# Patient Record
Sex: MALE | Race: WHITE | NOT HISPANIC OR LATINO
[De-identification: names, ages, dates, MRNs, and addresses within clinical notes are randomized per-mention and may not be internally consistent; named-entity substitution may affect disease eponyms.]

---

## 2018-11-30 ENCOUNTER — APPOINTMENT (OUTPATIENT)
Dept: INTERNAL MEDICINE | Facility: CLINIC | Age: 78
End: 2018-11-30

## 2018-12-03 ENCOUNTER — RESULT REVIEW (OUTPATIENT)
Age: 78
End: 2018-12-03

## 2018-12-03 ENCOUNTER — APPOINTMENT (OUTPATIENT)
Dept: PAIN MANAGEMENT | Facility: HOSPITAL | Age: 78
End: 2018-12-03

## 2018-12-19 ENCOUNTER — RECORD ABSTRACTING (OUTPATIENT)
Age: 78
End: 2018-12-19

## 2018-12-19 DIAGNOSIS — Z84.1 FAMILY HISTORY OF DISORDERS OF KIDNEY AND URETER: ICD-10-CM

## 2018-12-19 DIAGNOSIS — M72.0 PALMAR FASCIAL FIBROMATOSIS [DUPUYTREN]: ICD-10-CM

## 2018-12-19 DIAGNOSIS — J42 UNSPECIFIED CHRONIC BRONCHITIS: ICD-10-CM

## 2018-12-19 DIAGNOSIS — Z83.3 FAMILY HISTORY OF DIABETES MELLITUS: ICD-10-CM

## 2018-12-19 DIAGNOSIS — E78.00 PURE HYPERCHOLESTEROLEMIA, UNSPECIFIED: ICD-10-CM

## 2018-12-21 ENCOUNTER — APPOINTMENT (OUTPATIENT)
Dept: PAIN MANAGEMENT | Facility: CLINIC | Age: 78
End: 2018-12-21
Payer: MEDICARE

## 2018-12-21 VITALS
BODY MASS INDEX: 26.07 KG/M2 | WEIGHT: 176 LBS | OXYGEN SATURATION: 96 % | HEART RATE: 106 BPM | HEIGHT: 69 IN | SYSTOLIC BLOOD PRESSURE: 118 MMHG | DIASTOLIC BLOOD PRESSURE: 72 MMHG

## 2018-12-21 DIAGNOSIS — Z86.79 PERSONAL HISTORY OF OTHER DISEASES OF THE CIRCULATORY SYSTEM: ICD-10-CM

## 2018-12-21 PROCEDURE — 99214 OFFICE O/P EST MOD 30 MIN: CPT

## 2019-01-02 ENCOUNTER — RX RENEWAL (OUTPATIENT)
Age: 79
End: 2019-01-02

## 2019-01-03 ENCOUNTER — RX RENEWAL (OUTPATIENT)
Age: 79
End: 2019-01-03

## 2019-01-10 ENCOUNTER — APPOINTMENT (OUTPATIENT)
Dept: PAIN MANAGEMENT | Facility: HOSPITAL | Age: 79
End: 2019-01-10

## 2019-01-10 ENCOUNTER — RESULT REVIEW (OUTPATIENT)
Age: 79
End: 2019-01-10

## 2019-01-16 ENCOUNTER — APPOINTMENT (OUTPATIENT)
Dept: PAIN MANAGEMENT | Facility: CLINIC | Age: 79
End: 2019-01-16
Payer: MEDICARE

## 2019-01-16 VITALS
BODY MASS INDEX: 26.07 KG/M2 | SYSTOLIC BLOOD PRESSURE: 122 MMHG | HEIGHT: 69 IN | WEIGHT: 176 LBS | DIASTOLIC BLOOD PRESSURE: 78 MMHG

## 2019-01-16 DIAGNOSIS — M47.816 SPONDYLOSIS W/OUT MYELOPATHY OR RADICULOPATHY, LUMBAR REGION: ICD-10-CM

## 2019-01-16 DIAGNOSIS — G62.9 POLYNEUROPATHY, UNSPECIFIED: ICD-10-CM

## 2019-01-16 DIAGNOSIS — M79.18 MYALGIA, OTHER SITE: ICD-10-CM

## 2019-01-16 PROCEDURE — 99214 OFFICE O/P EST MOD 30 MIN: CPT

## 2019-01-16 NOTE — ASSESSMENT
[FreeTextEntry1] : : Pain may be secondary to severe spinal stenosis worst at L3-4, and L4-5. Given significant pain despite conservative treatments., s/p RIGHT L3-4 and L4-5 transforaminal epidural steroid injection with improvement in radicular pain\par \par SIgnificant pain maladaptive, with + SIJ provocative tests and refractory to conservative treatments.  Will schedule RIGHT SIJ steroid injection r/b/a discussed\par \par informed patient of risks of steroid administration including transient worsening of blood glucose, htn, mood changes, progressive osteoporosis.  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 Will s/p right cluneal nerve block with 10 days for 100% relief\par Continue home exercises\par continue acupuncture.    \par  \par  Significant pain secondary to facet arthritis. Refractory to conservative treatments. S/o right L3-sacral ala medial bnrach block. 60% relief.\par s/p right l3-sacral ala medial branch radiofrequecy ablation (1/2018) with improvement in pain 50% that was sustained. Patient feelst hat axial back pain has returning and affecting QOL\par s/p repeat right L3-sacral ala medial branch radiofreqeucny ablation with improvement in axial back pain\par  \par  coontinue home exercises\par trial voltaren gel prn pain.    \par \par  \par Instructed patient to maintain pain diary to monitor pain level, mobility, and function.\par Patient was instructed to call with any worsening symptoms including severe pain, new numbness/weakness, or changes in the bowel/bladder function.    \par \par \par

## 2019-01-16 NOTE — PHYSICAL EXAM
[Normal muscle bulk without asymmetry] : normal muscle bulk without asymmetry [Spine: Flexion to ___ degrees, without pain] : spine: flexion to [unfilled] degrees, without pain [Normal] : Gait: normal [] : Motor: [NL] : normal and symmetric bilaterally [VASU Test] : positive VASU Test (Raffi's Test) [SI Provacative Testing] : positive SI Provacative Testing

## 2019-01-16 NOTE — HISTORY OF PRESENT ILLNESS
[Back Pain] : back pain [FreeTextEntry1] : Interval Note:\par s/p RIGHT L3-4 and L4-5 transforaminal epidural steroid injection 1/10/19 with 50% improvement in back pain and leg pain.  Continues to complain of right buttock pain when ambulating.  Patient saw Dr. Fredy MCKNIGHT who stated that he may benefit from SIJ steroid injection.  States that it is worse with bending, twisting and turning.  Denies any additional weakness, numbness, bowel/bladder dysfunction.  Pain intensity is 5/10\par \par Mr. JOSE MCARTHUR is a 78 year M with pmhx of  hlp on [id number] of primary prophylaxis, crf followed by Dr. Lee, bilateral knee replacement at [facility] 12 years ago, presents with right lower back pain without radiating, worse with golfing/tennis/ activity. Improves with rest. Pain started insiduously but worsened 6 weeks ago without inciting event. Denies weakness, numbness, bowel/bladder incontinence. Pain level is 5/10.\par \par  Pain therapies \par         PT - mild relief\par \par Previous nerve block or injection\par \par s/p RIGHT L3-4 and L4-5 transforaminal epidural steroid injection 1/10/19 \par         right L3-sacral ala medial branch block 11/5/18\par         right cluneal nerve block 7/5/18\par         right L3-4, L4-5 transforaminal epidural steroid iinjection\par         s/p L3-sacral ala right MBB rfa with 50% improvement\par         improved bilatearl L3-sacral ala mbb 10/12/17\par         LESI by Dr. Echavarria 10/2017 - minimal relief\par         \par \par  Imaging studies  \par \par         MRI LS 10/2017\par \par L1-2 minimal posterior step-off of L1 on L2, no central/foraminal narrowing\par         L2-3 market ligamentum hypertrophy and changes of facet arthrtiis with indentation of L3 nerve roots in lateral recess with moderate central canal narrowing. Mild neurofomirnal narrowing \par         L3-4 mild disc bulge, indenting the L4 nerve roots in the lateral recess. Marked ligamentum hypertrophy and chnages of marked facet arthritis, with modarete to severe central canal and mild neuroforminal narrowing.\par         L4-5 Minimal anterior step-off of L4 on L5, mild uncovering of disc. Marked ligamentum hypertrophy tehre is indentation of right greater than let L5 nerve roots and indentation especially from left posterior lateral thecal sac with trifoliated appearance of moderate/severe central canal and mild neuroformianl narrowing. Marked facet arthritis\par         L5-S1 slight disc bulge, indenting the left greater than right S1 nerve roots. Modreate-marked ligamentum hypertorphy indenting the posterior lateral aspect of the thecal cord. Facet arthritis with slight left neuroforimanl narrowing\par \par  [5] : an average pain level of 5/10 [9] : a maximum pain level of 9/10 [Aching] : aching [Transitioning] : transitioning [Bending] : bending [Rest] : rest [FreeTextEntry7] : Patient presents with  right lower back pain. The pain has been occurring for many years but worse in the last few months. The pain frequency is constant. The character of the pain is described as _, Throbbing, Dull. The patient reports pain level at NRS:  5/10. . With activity, the pain intensity increases to  7/10. The pain increases with activity. The pain is decreased by rest. Patient reports that perfoming activities of daily living very dfificult.

## 2019-01-17 ENCOUNTER — APPOINTMENT (OUTPATIENT)
Dept: PAIN MANAGEMENT | Facility: HOSPITAL | Age: 79
End: 2019-01-17

## 2019-01-17 ENCOUNTER — RESULT REVIEW (OUTPATIENT)
Age: 79
End: 2019-01-17

## 2019-01-24 ENCOUNTER — APPOINTMENT (OUTPATIENT)
Dept: VASCULAR SURGERY | Facility: CLINIC | Age: 79
End: 2019-01-24
Payer: MEDICARE

## 2019-01-24 VITALS
DIASTOLIC BLOOD PRESSURE: 80 MMHG | HEIGHT: 69 IN | SYSTOLIC BLOOD PRESSURE: 136 MMHG | WEIGHT: 170 LBS | HEART RATE: 77 BPM | BODY MASS INDEX: 25.18 KG/M2

## 2019-01-24 DIAGNOSIS — Z78.9 OTHER SPECIFIED HEALTH STATUS: ICD-10-CM

## 2019-01-24 PROCEDURE — 93978 VASCULAR STUDY: CPT

## 2019-01-24 PROCEDURE — 99204 OFFICE O/P NEW MOD 45 MIN: CPT

## 2019-01-25 PROBLEM — Z78.9 CONSUMES ALCOHOL OCCASIONALLY: Status: ACTIVE | Noted: 2019-01-24

## 2019-01-25 PROBLEM — Z78.9 EXERCISES OCCASIONALLY: Status: ACTIVE | Noted: 2019-01-24

## 2019-01-25 NOTE — ASSESSMENT
[FreeTextEntry1] : 77 yo male with AAA. It appears that his aneurysm grew >5 mm over the last year ( it is incompletely imaged on MRI from 2018). The patient's father had a ruptured abdominal aortic aneurysm and I think it is reasonable to intervene secondary to the rapid growth combined with the family history. He is reluctant to proceed with a CTA given his elevated CR. I discussed this with Dr. Nazario. His Cr is stable at 2.5.\par \par Will proceed with CT scan of abdomen and pelvis without contrast\par

## 2019-01-25 NOTE — HISTORY OF PRESENT ILLNESS
[FreeTextEntry1] : 79 yo male referred by Dr. Dillon for a AAA. The patient reports that he has a history of back pain.He underwent an MRI which demonstrated a 5.0 cm AAA. He reports that he underwent a MRI for a similar reason 1 year ago and that there was no mention of a AAA. He reports that his father had a ruptured AAA. He has CRI, but is unsure of his kidney function.

## 2019-01-25 NOTE — CONSULT LETTER
[Dear  ___] : Dear ~TOSHIA, [Consult Letter:] : I had the pleasure of evaluating your patient, [unfilled]. [Please see my note below.] : Please see my note below. [Consult Closing:] : Thank you very much for allowing me to participate in the care of this patient.  If you have any questions, please do not hesitate to contact me. [Sincerely,] : Sincerely, [FreeTextEntry2] : Rosalina Aravind\par Stere Jacquelin\par  [FreeTextEntry3] : Monty

## 2019-01-25 NOTE — DATA REVIEWED
[FreeTextEntry1] : u/s AAA - 5.1 cm AAA\par \par MRI from 2018 and current reviewed with the radiologist

## 2019-01-25 NOTE — REVIEW OF SYSTEMS
[Fever] : no fever [Chills] : no chills [Recent Weight Loss (___ Lbs)] : recent [unfilled] ~Ulb weight loss [Leg Claudication] : no intermittent leg claudication [Lower Ext Edema] : no extremity edema [As Noted in HPI] : as noted in HPI [Limb Pain] : no limb pain [Limb Swelling] : no limb swelling

## 2019-01-30 ENCOUNTER — RESULT REVIEW (OUTPATIENT)
Age: 79
End: 2019-01-30

## 2019-02-01 ENCOUNTER — APPOINTMENT (OUTPATIENT)
Dept: VASCULAR SURGERY | Facility: CLINIC | Age: 79
End: 2019-02-01
Payer: MEDICARE

## 2019-02-01 ENCOUNTER — OUTPATIENT (OUTPATIENT)
Dept: OUTPATIENT SERVICES | Facility: HOSPITAL | Age: 79
LOS: 1 days | End: 2019-02-01
Payer: MEDICARE

## 2019-02-01 DIAGNOSIS — Z01.818 ENCOUNTER FOR OTHER PREPROCEDURAL EXAMINATION: ICD-10-CM

## 2019-02-01 LAB
ALBUMIN SERPL ELPH-MCNC: 4.4 G/DL — SIGNIFICANT CHANGE UP (ref 3.3–5)
ALP SERPL-CCNC: 61 U/L — SIGNIFICANT CHANGE UP (ref 40–120)
ALT FLD-CCNC: 13 U/L — SIGNIFICANT CHANGE UP (ref 10–45)
ANION GAP SERPL CALC-SCNC: 12 MMOL/L — SIGNIFICANT CHANGE UP (ref 5–17)
APTT BLD: 33.2 SEC — SIGNIFICANT CHANGE UP (ref 27.5–36.3)
AST SERPL-CCNC: 22 U/L — SIGNIFICANT CHANGE UP (ref 10–40)
BASOPHILS NFR BLD AUTO: 0.4 % — SIGNIFICANT CHANGE UP (ref 0–2)
BILIRUB SERPL-MCNC: 0.4 MG/DL — SIGNIFICANT CHANGE UP (ref 0.2–1.2)
BLD GP AB SCN SERPL QL: NEGATIVE — SIGNIFICANT CHANGE UP
BLD GP AB SCN SERPL QL: NEGATIVE — SIGNIFICANT CHANGE UP
BUN SERPL-MCNC: 29 MG/DL — HIGH (ref 7–23)
CALCIUM SERPL-MCNC: 9.2 MG/DL — SIGNIFICANT CHANGE UP (ref 8.4–10.5)
CHLORIDE SERPL-SCNC: 109 MMOL/L — HIGH (ref 96–108)
CO2 SERPL-SCNC: 23 MMOL/L — SIGNIFICANT CHANGE UP (ref 22–31)
CREAT SERPL-MCNC: 2.19 MG/DL — HIGH (ref 0.5–1.3)
EOSINOPHIL NFR BLD AUTO: 1.6 % — SIGNIFICANT CHANGE UP (ref 0–6)
GLUCOSE SERPL-MCNC: 96 MG/DL — SIGNIFICANT CHANGE UP (ref 70–99)
HCT VFR BLD CALC: 39.2 % — SIGNIFICANT CHANGE UP (ref 39–50)
HGB BLD-MCNC: 13.4 G/DL — SIGNIFICANT CHANGE UP (ref 13–17)
INR BLD: 1.03 — SIGNIFICANT CHANGE UP (ref 0.88–1.16)
LYMPHOCYTES # BLD AUTO: 12.4 % — LOW (ref 13–44)
MCHC RBC-ENTMCNC: 30.1 PG — SIGNIFICANT CHANGE UP (ref 27–34)
MCHC RBC-ENTMCNC: 34.2 G/DL — SIGNIFICANT CHANGE UP (ref 32–36)
MCV RBC AUTO: 88.1 FL — SIGNIFICANT CHANGE UP (ref 80–100)
MONOCYTES NFR BLD AUTO: 8.4 % — SIGNIFICANT CHANGE UP (ref 2–14)
NEUTROPHILS NFR BLD AUTO: 77.2 % — HIGH (ref 43–77)
PLATELET # BLD AUTO: 198 K/UL — SIGNIFICANT CHANGE UP (ref 150–400)
POTASSIUM SERPL-MCNC: 4.7 MMOL/L — SIGNIFICANT CHANGE UP (ref 3.5–5.3)
POTASSIUM SERPL-SCNC: 4.7 MMOL/L — SIGNIFICANT CHANGE UP (ref 3.5–5.3)
PROT SERPL-MCNC: 7 G/DL — SIGNIFICANT CHANGE UP (ref 6–8.3)
PROTHROM AB SERPL-ACNC: 11.6 SEC — SIGNIFICANT CHANGE UP (ref 10–12.9)
RBC # BLD: 4.45 M/UL — SIGNIFICANT CHANGE UP (ref 4.2–5.8)
RBC # FLD: 15.1 % — SIGNIFICANT CHANGE UP (ref 10.3–16.9)
RH IG SCN BLD-IMP: POSITIVE — SIGNIFICANT CHANGE UP
RH IG SCN BLD-IMP: POSITIVE — SIGNIFICANT CHANGE UP
SODIUM SERPL-SCNC: 144 MMOL/L — SIGNIFICANT CHANGE UP (ref 135–145)
WBC # BLD: 7.9 K/UL — SIGNIFICANT CHANGE UP (ref 3.8–10.5)
WBC # FLD AUTO: 7.9 K/UL — SIGNIFICANT CHANGE UP (ref 3.8–10.5)

## 2019-02-01 PROCEDURE — 99214 OFFICE O/P EST MOD 30 MIN: CPT

## 2019-02-01 PROCEDURE — 86901 BLOOD TYPING SEROLOGIC RH(D): CPT

## 2019-02-01 PROCEDURE — 80053 COMPREHEN METABOLIC PANEL: CPT

## 2019-02-01 PROCEDURE — 93925 LOWER EXTREMITY STUDY: CPT

## 2019-02-01 PROCEDURE — 85610 PROTHROMBIN TIME: CPT

## 2019-02-01 PROCEDURE — 85025 COMPLETE CBC W/AUTO DIFF WBC: CPT

## 2019-02-01 PROCEDURE — 86900 BLOOD TYPING SEROLOGIC ABO: CPT

## 2019-02-01 PROCEDURE — 86850 RBC ANTIBODY SCREEN: CPT

## 2019-02-01 PROCEDURE — 85730 THROMBOPLASTIN TIME PARTIAL: CPT

## 2019-02-01 PROCEDURE — 93880 EXTRACRANIAL BILAT STUDY: CPT

## 2019-02-01 NOTE — PHYSICAL EXAM
[Normal Breath Sounds] : Normal breath sounds [Normal Heart Sounds] : normal heart sounds [1+] : left 1+ [2+] : left 2+ [No Rash or Lesion] : No rash or lesion [Alert] : alert [Oriented to Person] : oriented to person [Oriented to Place] : oriented to place [Oriented to Time] : oriented to time [Calm] : calm [Carotid Bruits] : no carotid bruits [Ankle Swelling (On Exam)] : not present [Varicose Veins Of Lower Extremities] : not present [Abdomen Tenderness] : ~T ~M No abdominal tenderness [Skin Ulcer] : no ulcer [de-identified] : AOx4, calm and cooperative, sitting in the chair [FreeTextEntry1] : Widened and enhanced pulsatility midline, above umbilicus

## 2019-02-01 NOTE — ASSESSMENT
[Smoking Cessation] : smoking cessation [Aneurysm Surgery] : aneurysm surgery [FreeTextEntry1] : 79 y/o M with PMH of HTN, HLD, CKD III, active smoker, and known infrarenal AAA (5.2cm) warranting repair. Benefits and risk of procedure options were discussed with pt and pt's wife during visit. Given location and extent of aneurysm, it has been deemed open repair is the best option for the patient at this time. \par \par Plan:\par -Surgery on 2/5/2019: Open AAA repair. \par -Cardiac records will be obtained from Dr Sarmiento's office. \par -Preop labs ordered and pt to obtain today after visit.

## 2019-02-01 NOTE — REVIEW OF SYSTEMS
[Joint Pain] : joint pain [Easy Bruising] : a tendency for easy bruising [Fever] : no fever [Chills] : no chills [Feeling Poorly] : not feeling poorly [Feeling Tired] : not feeling tired [Heart Rate Is Slow] : the heart rate was not slow [Heart Rate Is Fast] : the heart rate was not fast [Chest Pain] : no chest pain [Palpitations] : no palpitations [Leg Claudication] : no intermittent leg claudication [Lower Ext Edema] : no extremity edema [Shortness Of Breath] : no shortness of breath [Wheezing] : no wheezing [Cough] : no cough [SOB on Exertion] : no shortness of breath during exertion [Abdominal Pain] : no abdominal pain [Vomiting] : no vomiting [Constipation] : no constipation [Diarrhea] : no diarrhea [Limb Pain] : no limb pain [Limb Swelling] : no limb swelling [Skin Lesions] : no skin lesions [Skin Wound] : no skin wound [Confused] : no confusion [Dizziness] : no dizziness [Difficulty Walking] : no difficulty walking [Easy Bleeding] : no tendency for easy bleeding [FreeTextEntry9] : Back pain and stiffness  [de-identified] : Easy bruising while he was on ASA 81mg a while ago and has now stopped

## 2019-02-01 NOTE — REASON FOR VISIT
[Initial Eval - Existing Diagnosis] : an initial evaluation of an existing diagnosis [Spouse] : spouse

## 2019-02-04 VITALS
OXYGEN SATURATION: 98 % | WEIGHT: 172.4 LBS | TEMPERATURE: 98 F | SYSTOLIC BLOOD PRESSURE: 120 MMHG | HEART RATE: 70 BPM | RESPIRATION RATE: 16 BRPM | DIASTOLIC BLOOD PRESSURE: 67 MMHG | HEIGHT: 69 IN

## 2019-02-04 NOTE — PATIENT PROFILE ADULT - NSPROEDALEARNPREF_GEN_A_NUR
Ventricular Rate : 64   Atrial Rate : 258   QRS Duration : 120   Q-T Interval : 406   QTC Calculation(Bezet) : 418   R Axis : -82   T Axis : 100   Diagnosis : Atrial fibrillation~Left axis deviation~Right bundle branch block~Inferior infarct , age undetermined~T wave abnormality, consider lateral ischemia~Abnormal ECG~When compared with ECG of 18-JAN-2017 22:28,~MANUAL COMPARISON REQUIRED, DATA IS UNCONFIRMED~Confirmed by BLAIRE KOTHARI, MERT ([id number]) on 1/20/2017 7:24:22 PM     
individual instruction

## 2019-02-04 NOTE — PRE-OP CHECKLIST - SELECT TESTS ORDERED
CMP/EKG/B+, stress echo/Type and Screen/CBC/PT/PTT/INR CBC/INR/Type and Screen/B+, stress echo, Ct chest 1/30/19, CT abd/pelvis 1/30/19/CMP/PT/PTT/EKG

## 2019-02-04 NOTE — PATIENT PROFILE ADULT - NSTRANSFERBELONGINGSDISPO_GEN_A_NUR
Patient refusing bed alarm. Charge YAO Garcia at bedside. Education provided. Bed alarm off.   not applicable

## 2019-02-05 ENCOUNTER — INPATIENT (INPATIENT)
Facility: HOSPITAL | Age: 79
LOS: 3 days | Discharge: HOME | DRG: 254 | End: 2019-02-09
Attending: SURGERY | Admitting: SURGERY
Payer: MEDICARE

## 2019-02-05 ENCOUNTER — APPOINTMENT (OUTPATIENT)
Age: 79
End: 2019-02-05

## 2019-02-05 ENCOUNTER — RESULT REVIEW (OUTPATIENT)
Age: 79
End: 2019-02-05

## 2019-02-05 DIAGNOSIS — Z98.890 OTHER SPECIFIED POSTPROCEDURAL STATES: Chronic | ICD-10-CM

## 2019-02-05 LAB
ANION GAP SERPL CALC-SCNC: 10 MMOL/L — SIGNIFICANT CHANGE UP (ref 5–17)
APTT BLD: 46.6 SEC — HIGH (ref 27.5–36.3)
BASE EXCESS BLDA CALC-SCNC: -7.3 MMOL/L — LOW (ref -2–3)
BASE EXCESS BLDA CALC-SCNC: 0.6 MMOL/L — SIGNIFICANT CHANGE UP (ref -2–3)
BASOPHILS # BLD AUTO: 0.06 K/UL — SIGNIFICANT CHANGE UP (ref 0–0.2)
BASOPHILS NFR BLD AUTO: 0.4 % — SIGNIFICANT CHANGE UP (ref 0–2)
BUN SERPL-MCNC: 36 MG/DL — HIGH (ref 7–23)
CA-I BLDA-SCNC: 0.99 MMOL/L — LOW (ref 1.12–1.3)
CA-I BLDA-SCNC: 1.45 MMOL/L — HIGH (ref 1.12–1.3)
CALCIUM SERPL-MCNC: 10.3 MG/DL — SIGNIFICANT CHANGE UP (ref 8.4–10.5)
CHLORIDE SERPL-SCNC: 113 MMOL/L — HIGH (ref 96–108)
CO2 SERPL-SCNC: 22 MMOL/L — SIGNIFICANT CHANGE UP (ref 22–31)
COHGB MFR BLDA: 0.3 % — SIGNIFICANT CHANGE UP
COHGB MFR BLDA: 0.4 % — SIGNIFICANT CHANGE UP
CREAT SERPL-MCNC: 1.96 MG/DL — HIGH (ref 0.5–1.3)
EOSINOPHIL # BLD AUTO: 0.06 K/UL — SIGNIFICANT CHANGE UP (ref 0–0.5)
EOSINOPHIL NFR BLD AUTO: 0.4 % — SIGNIFICANT CHANGE UP (ref 0–6)
GAS PNL BLDA: SIGNIFICANT CHANGE UP
GLUCOSE BLDC GLUCOMTR-MCNC: 107 MG/DL — HIGH (ref 70–99)
GLUCOSE BLDC GLUCOMTR-MCNC: 139 MG/DL — HIGH (ref 70–99)
GLUCOSE BLDC GLUCOMTR-MCNC: 174 MG/DL — HIGH (ref 70–99)
GLUCOSE SERPL-MCNC: 168 MG/DL — HIGH (ref 70–99)
HCO3 BLDA-SCNC: 19 MMOL/L — LOW (ref 21–28)
HCO3 BLDA-SCNC: 27 MMOL/L — SIGNIFICANT CHANGE UP (ref 21–28)
HCT VFR BLD CALC: 35.4 % — LOW (ref 39–50)
HGB BLD-MCNC: 11.2 G/DL — LOW (ref 13–17)
HGB BLDA-MCNC: 10.2 G/DL — LOW (ref 13–17)
HGB BLDA-MCNC: 11.6 G/DL — LOW (ref 13–17)
IMM GRANULOCYTES NFR BLD AUTO: 1.2 % — SIGNIFICANT CHANGE UP (ref 0–1.5)
INR BLD: 1.14 — SIGNIFICANT CHANGE UP (ref 0.88–1.16)
LYMPHOCYTES # BLD AUTO: 1.39 K/UL — SIGNIFICANT CHANGE UP (ref 1–3.3)
LYMPHOCYTES # BLD AUTO: 9.7 % — LOW (ref 13–44)
MAGNESIUM SERPL-MCNC: 1.7 MG/DL — SIGNIFICANT CHANGE UP (ref 1.6–2.6)
MCHC RBC-ENTMCNC: 28.8 PG — SIGNIFICANT CHANGE UP (ref 27–34)
MCHC RBC-ENTMCNC: 31.6 GM/DL — LOW (ref 32–36)
MCV RBC AUTO: 91 FL — SIGNIFICANT CHANGE UP (ref 80–100)
METHGB MFR BLDA: 0.1 % — SIGNIFICANT CHANGE UP
METHGB MFR BLDA: 0.2 % — SIGNIFICANT CHANGE UP
MONOCYTES # BLD AUTO: 0.22 K/UL — SIGNIFICANT CHANGE UP (ref 0–0.9)
MONOCYTES NFR BLD AUTO: 1.5 % — LOW (ref 2–14)
NEUTROPHILS # BLD AUTO: 12.37 K/UL — HIGH (ref 1.8–7.4)
NEUTROPHILS NFR BLD AUTO: 86.8 % — HIGH (ref 43–77)
O2 CT VFR BLDA CALC: 15 ML/DL — SIGNIFICANT CHANGE UP (ref 15–23)
O2 CT VFR BLDA CALC: SIGNIFICANT CHANGE UP (ref 15–23)
OXYHGB MFR BLDA: 99 % — SIGNIFICANT CHANGE UP (ref 94–100)
OXYHGB MFR BLDA: 99 % — SIGNIFICANT CHANGE UP (ref 94–100)
PCO2 BLDA: 39 MMHG — SIGNIFICANT CHANGE UP (ref 35–48)
PCO2 BLDA: 52 MMHG — HIGH (ref 35–48)
PH BLDA: 7.29 — LOW (ref 7.35–7.45)
PH BLDA: 7.34 — LOW (ref 7.35–7.45)
PLATELET # BLD AUTO: 324 K/UL — SIGNIFICANT CHANGE UP (ref 150–400)
PO2 BLDA: 312 MMHG — HIGH (ref 83–108)
PO2 BLDA: 315 MMHG — HIGH (ref 83–108)
POTASSIUM BLDA-SCNC: 3.7 MMOL/L — SIGNIFICANT CHANGE UP (ref 3.5–4.9)
POTASSIUM BLDA-SCNC: 4.1 MMOL/L — SIGNIFICANT CHANGE UP (ref 3.5–4.9)
POTASSIUM SERPL-MCNC: 4.6 MMOL/L — SIGNIFICANT CHANGE UP (ref 3.5–5.3)
POTASSIUM SERPL-SCNC: 4.6 MMOL/L — SIGNIFICANT CHANGE UP (ref 3.5–5.3)
PROTHROM AB SERPL-ACNC: 12.9 SEC — SIGNIFICANT CHANGE UP (ref 10–12.9)
RBC # BLD: 3.89 M/UL — LOW (ref 4.2–5.8)
RBC # FLD: 15 % — HIGH (ref 10.3–14.5)
SAO2 % BLDA: 100 % — SIGNIFICANT CHANGE UP (ref 95–100)
SAO2 % BLDA: 99 % — SIGNIFICANT CHANGE UP (ref 95–100)
SODIUM BLDA-SCNC: 142 MMOL/L — SIGNIFICANT CHANGE UP (ref 138–146)
SODIUM BLDA-SCNC: 144 MMOL/L — SIGNIFICANT CHANGE UP (ref 138–146)
SODIUM SERPL-SCNC: 145 MMOL/L — SIGNIFICANT CHANGE UP (ref 135–145)
WBC # BLD: 14.27 K/UL — HIGH (ref 3.8–10.5)
WBC # FLD AUTO: 14.27 K/UL — HIGH (ref 3.8–10.5)

## 2019-02-05 PROCEDURE — 99222 1ST HOSP IP/OBS MODERATE 55: CPT | Mod: GC

## 2019-02-05 PROCEDURE — 35081 REPAIR DEFECT OF ARTERY: CPT

## 2019-02-05 PROCEDURE — 35081 REPAIR DEFECT OF ARTERY: CPT | Mod: 82

## 2019-02-05 PROCEDURE — 71045 X-RAY EXAM CHEST 1 VIEW: CPT | Mod: 26

## 2019-02-05 RX ORDER — CEFAZOLIN SODIUM 1 G
2000 VIAL (EA) INJECTION EVERY 8 HOURS
Qty: 0 | Refills: 0 | Status: DISCONTINUED | OUTPATIENT
Start: 2019-02-05 | End: 2019-02-05

## 2019-02-05 RX ORDER — CEFAZOLIN SODIUM 1 G
2000 VIAL (EA) INJECTION EVERY 8 HOURS
Qty: 0 | Refills: 0 | Status: DISCONTINUED | OUTPATIENT
Start: 2019-02-05 | End: 2019-02-06

## 2019-02-05 RX ORDER — NICARDIPINE HYDROCHLORIDE 30 MG/1
5 CAPSULE, EXTENDED RELEASE ORAL
Qty: 40 | Refills: 0 | Status: DISCONTINUED | OUTPATIENT
Start: 2019-02-05 | End: 2019-02-06

## 2019-02-05 RX ORDER — ATORVASTATIN CALCIUM 80 MG/1
40 TABLET, FILM COATED ORAL AT BEDTIME
Qty: 0 | Refills: 0 | Status: DISCONTINUED | OUTPATIENT
Start: 2019-02-05 | End: 2019-02-09

## 2019-02-05 RX ORDER — INSULIN LISPRO 100/ML
VIAL (ML) SUBCUTANEOUS EVERY 6 HOURS
Qty: 0 | Refills: 0 | Status: DISCONTINUED | OUTPATIENT
Start: 2019-02-05 | End: 2019-02-08

## 2019-02-05 RX ORDER — AMLODIPINE BESYLATE 2.5 MG/1
10 TABLET ORAL DAILY
Qty: 0 | Refills: 0 | Status: DISCONTINUED | OUTPATIENT
Start: 2019-02-05 | End: 2019-02-06

## 2019-02-05 RX ORDER — SODIUM CHLORIDE 9 MG/ML
1000 INJECTION, SOLUTION INTRAVENOUS
Qty: 0 | Refills: 0 | Status: DISCONTINUED | OUTPATIENT
Start: 2019-02-05 | End: 2019-02-06

## 2019-02-05 RX ORDER — AMLODIPINE BESYLATE 2.5 MG/1
1 TABLET ORAL
Qty: 0 | Refills: 0 | COMMUNITY

## 2019-02-05 RX ORDER — ROSUVASTATIN CALCIUM 5 MG/1
1 TABLET ORAL
Qty: 0 | Refills: 0 | COMMUNITY

## 2019-02-05 RX ORDER — ACETAMINOPHEN 500 MG
1000 TABLET ORAL ONCE
Qty: 0 | Refills: 0 | Status: COMPLETED | OUTPATIENT
Start: 2019-02-05 | End: 2019-02-05

## 2019-02-05 RX ORDER — HYDROMORPHONE HYDROCHLORIDE 2 MG/ML
0.5 INJECTION INTRAMUSCULAR; INTRAVENOUS; SUBCUTANEOUS
Qty: 0 | Refills: 0 | Status: DISCONTINUED | OUTPATIENT
Start: 2019-02-05 | End: 2019-02-08

## 2019-02-05 RX ADMIN — HYDROMORPHONE HYDROCHLORIDE 0.5 MILLIGRAM(S): 2 INJECTION INTRAMUSCULAR; INTRAVENOUS; SUBCUTANEOUS at 17:28

## 2019-02-05 RX ADMIN — Medication 2000 MILLIGRAM(S): at 21:38

## 2019-02-05 RX ADMIN — Medication 2: at 16:49

## 2019-02-05 RX ADMIN — ATORVASTATIN CALCIUM 40 MILLIGRAM(S): 80 TABLET, FILM COATED ORAL at 23:10

## 2019-02-05 RX ADMIN — HYDROMORPHONE HYDROCHLORIDE 0.5 MILLIGRAM(S): 2 INJECTION INTRAMUSCULAR; INTRAVENOUS; SUBCUTANEOUS at 17:58

## 2019-02-05 RX ADMIN — Medication 400 MILLIGRAM(S): at 21:00

## 2019-02-05 RX ADMIN — Medication 1000 MILLIGRAM(S): at 21:30

## 2019-02-05 NOTE — BRIEF OPERATIVE NOTE - PROCEDURE
<<-----Click on this checkbox to enter Procedure AAA repair with graft  02/05/2019  open AAA repair. Transabdominal approach  Active  MVISMER

## 2019-02-05 NOTE — BRIEF OPERATIVE NOTE - OPERATION/FINDINGS
Transabdominal approach.   Juxtarenal AAA repaired with 18 mm tube graft with anastomosis to distal aorta.   25 mg of manitol given at 1 pm

## 2019-02-05 NOTE — CONSULT NOTE ADULT - ASSESSMENT
77yo M h/o CKD stage 3, baseline Cr 2.2, HLD, HTN, Chronic Bronchitis, AAA now s/p open repair    Neuro: tylenole prn, dilaudid prn  CV: HD stable, SBP goals 110-150, cardene gtt as needed, norvasc PO in AM, lipitor  Pulm:satting well on NC  GI/FEN: NPO/NGT to LIWS  : Etienne for strict Is&Os, replete diuresis >600 in 4 hours  ID: Ancef post op (2/5-6)  Endo: iss  Heme: no chemo ppx  PPX: SCDs  Lines: PIVs, Keisha  Wounds: midline incision  PT/OT: not ordered

## 2019-02-05 NOTE — H&P PST ADULT - ASSESSMENT
77 yo M with COPD, CKD, HTN, AAA:   - Admit to vascular surgery for AAA repair    - NPO/IVF/ Pre-op eval      CKD: Renal consult (Dr. Velásquez) and light hydration.  - Trend I/O  - Etienne catheter     COPD: NC as needed, nebbs PRN

## 2019-02-05 NOTE — CONSULT NOTE ADULT - SUBJECTIVE AND OBJECTIVE BOX
77yo M h/o CKD stage 3, baseline Cr 2.2, HLD, HTN, Chronic Bronchitis, AAA now s/p open repair    Meds: amlodipine 10, crestor 10  Allergies    No Known Allergies    Intolerances        Vitals: Vital Signs Last 24 Hrs  T(C): 35.7 (05 Feb 2019 15:49), Max: 36.9 (04 Feb 2019 16:26)  T(F): 96.2 (05 Feb 2019 15:49), Max: 98.4 (04 Feb 2019 16:26)  HR: 110 (05 Feb 2019 15:45) (70 - 110)  BP: 135/78 (05 Feb 2019 15:45) (120/67 - 135/78)  BP(mean): 99 (05 Feb 2019 15:45) (99 - 99)  RR: 16 (05 Feb 2019 15:30) (16 - 16)  SpO2: 99% (05 Feb 2019 15:45) (98% - 99%)  CAPILLARY BLOOD GLUCOSE      POCT Blood Glucose.: 107 mg/dL (05 Feb 2019 13:13)      Labs: pending          Exam:  Neuro: A&Ox3, NAD, grossly neuro intact  HEENT: PERRL < EOMI, MMM  Neck: Supple  CV: RRR, no MRGs  Pulm: CTAB, no wheezes, rales, or rhonchi  Abd: BS (-), Soft, diffusely ttp throughout, dressing intact over long midline incision  : Etienne in place  Ext: No edema peripherally or centrally  Vasc: 2+ pulses - radial and PT  MSK: no joint swelling  Skin: no rash  Psych: affect appropriate 77yo M h/o CKD stage 3, baseline Cr 2.2, HLD, HTN, Chronic Bronchitis, AAA now s/p open repair  Pain is well controlled, no SOB or CP, 10 point ROS otherwise negative    Meds: amlodipine 10, crestor 10  Allergies    No Known Allergies    Intolerances        Vitals: Vital Signs Last 24 Hrs  T(C): 35.7 (05 Feb 2019 15:49), Max: 36.9 (04 Feb 2019 16:26)  T(F): 96.2 (05 Feb 2019 15:49), Max: 98.4 (04 Feb 2019 16:26)  HR: 110 (05 Feb 2019 15:45) (70 - 110)  BP: 135/78 (05 Feb 2019 15:45) (120/67 - 135/78)  BP(mean): 99 (05 Feb 2019 15:45) (99 - 99)  RR: 16 (05 Feb 2019 15:30) (16 - 16)  SpO2: 99% (05 Feb 2019 15:45) (98% - 99%)  CAPILLARY BLOOD GLUCOSE      POCT Blood Glucose.: 107 mg/dL (05 Feb 2019 13:13)      Labs: pending          Exam:  Neuro: A&Ox3, NAD, grossly neuro intact  HEENT: PERRL < EOMI, MMM  Neck: Supple  CV: RRR, no MRGs  Pulm: CTAB, no wheezes, rales, or rhonchi  Abd: BS (-), Soft, diffusely ttp throughout, dressing intact over long midline incision  : Etienne in place  Ext: No edema peripherally or centrally  Vasc: 2+ pulses - radial and PT  MSK: no joint swelling  Skin: no rash  Psych: affect appropriate

## 2019-02-05 NOTE — H&P PST ADULT - HISTORY OF PRESENT ILLNESS
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   The aneurysm was incidentally found during imagining for back pain. He was seen by Dr. Hinojosa in [facility] and referred for further evaluation to dr. Cunha.     Patient is not a candidate for EVAR due to the location of the aneurysm.    He smoked for 15 yrs and quit in early adulthood.   Not on ASA or AC due to bleeding.

## 2019-02-05 NOTE — H&P PST ADULT - FAMILY HISTORY
Father  Still living? Unknown  Family history of abdominal aortic aneurysm (AAA), Age at diagnosis: Age Unknown

## 2019-02-05 NOTE — H&P PST ADULT - PMH
AAA (abdominal aortic aneurysm)    BPH (benign prostatic hyperplasia)    CKD (chronic kidney disease) stage 3, GFR 30-59 ml/min    Hyperlipidemia    Hypertension    Spinal stenosis

## 2019-02-06 LAB
ANION GAP SERPL CALC-SCNC: 12 MMOL/L — SIGNIFICANT CHANGE UP (ref 5–17)
BUN SERPL-MCNC: 38 MG/DL — HIGH (ref 7–23)
CALCIUM SERPL-MCNC: 9.3 MG/DL — SIGNIFICANT CHANGE UP (ref 8.4–10.5)
CHLORIDE SERPL-SCNC: 107 MMOL/L — SIGNIFICANT CHANGE UP (ref 96–108)
CO2 SERPL-SCNC: 21 MMOL/L — LOW (ref 22–31)
CREAT SERPL-MCNC: 2.21 MG/DL — HIGH (ref 0.5–1.3)
GLUCOSE BLDC GLUCOMTR-MCNC: 113 MG/DL — HIGH (ref 70–99)
GLUCOSE BLDC GLUCOMTR-MCNC: 114 MG/DL — HIGH (ref 70–99)
GLUCOSE BLDC GLUCOMTR-MCNC: 122 MG/DL — HIGH (ref 70–99)
GLUCOSE BLDC GLUCOMTR-MCNC: 129 MG/DL — HIGH (ref 70–99)
GLUCOSE SERPL-MCNC: 149 MG/DL — HIGH (ref 70–99)
HCT VFR BLD CALC: 33.3 % — LOW (ref 39–50)
HGB BLD-MCNC: 10.9 G/DL — LOW (ref 13–17)
MAGNESIUM SERPL-MCNC: 1.7 MG/DL — SIGNIFICANT CHANGE UP (ref 1.6–2.6)
MCHC RBC-ENTMCNC: 29.3 PG — SIGNIFICANT CHANGE UP (ref 27–34)
MCHC RBC-ENTMCNC: 32.7 GM/DL — SIGNIFICANT CHANGE UP (ref 32–36)
MCV RBC AUTO: 89.5 FL — SIGNIFICANT CHANGE UP (ref 80–100)
NRBC # BLD: 0 /100 WBCS — SIGNIFICANT CHANGE UP (ref 0–0)
PHOSPHATE SERPL-MCNC: 5.2 MG/DL — HIGH (ref 2.5–4.5)
PLATELET # BLD AUTO: 303 K/UL — SIGNIFICANT CHANGE UP (ref 150–400)
POTASSIUM SERPL-MCNC: 5.1 MMOL/L — SIGNIFICANT CHANGE UP (ref 3.5–5.3)
POTASSIUM SERPL-SCNC: 5.1 MMOL/L — SIGNIFICANT CHANGE UP (ref 3.5–5.3)
RBC # BLD: 3.72 M/UL — LOW (ref 4.2–5.8)
RBC # FLD: 14.6 % — HIGH (ref 10.3–14.5)
SODIUM SERPL-SCNC: 140 MMOL/L — SIGNIFICANT CHANGE UP (ref 135–145)
WBC # BLD: 14.39 K/UL — HIGH (ref 3.8–10.5)
WBC # FLD AUTO: 14.39 K/UL — HIGH (ref 3.8–10.5)

## 2019-02-06 PROCEDURE — 99223 1ST HOSP IP/OBS HIGH 75: CPT | Mod: GC

## 2019-02-06 PROCEDURE — 99233 SBSQ HOSP IP/OBS HIGH 50: CPT | Mod: GC

## 2019-02-06 RX ORDER — METOPROLOL TARTRATE 50 MG
10 TABLET ORAL EVERY 6 HOURS
Qty: 0 | Refills: 0 | Status: DISCONTINUED | OUTPATIENT
Start: 2019-02-06 | End: 2019-02-07

## 2019-02-06 RX ORDER — HEPARIN SODIUM 5000 [USP'U]/ML
5000 INJECTION INTRAVENOUS; SUBCUTANEOUS EVERY 8 HOURS
Qty: 0 | Refills: 0 | Status: DISCONTINUED | OUTPATIENT
Start: 2019-02-06 | End: 2019-02-09

## 2019-02-06 RX ORDER — SODIUM CHLORIDE 9 MG/ML
1000 INJECTION INTRAMUSCULAR; INTRAVENOUS; SUBCUTANEOUS
Qty: 0 | Refills: 0 | Status: DISCONTINUED | OUTPATIENT
Start: 2019-02-06 | End: 2019-02-07

## 2019-02-06 RX ORDER — SODIUM CHLORIDE 9 MG/ML
1000 INJECTION, SOLUTION INTRAVENOUS
Qty: 0 | Refills: 0 | Status: DISCONTINUED | OUTPATIENT
Start: 2019-02-06 | End: 2019-02-06

## 2019-02-06 RX ORDER — METOPROLOL TARTRATE 50 MG
5 TABLET ORAL EVERY 6 HOURS
Qty: 0 | Refills: 0 | Status: DISCONTINUED | OUTPATIENT
Start: 2019-02-06 | End: 2019-02-06

## 2019-02-06 RX ORDER — METOPROLOL TARTRATE 50 MG
5 TABLET ORAL ONCE
Qty: 0 | Refills: 0 | Status: COMPLETED | OUTPATIENT
Start: 2019-02-06 | End: 2019-02-06

## 2019-02-06 RX ADMIN — HEPARIN SODIUM 5000 UNIT(S): 5000 INJECTION INTRAVENOUS; SUBCUTANEOUS at 14:14

## 2019-02-06 RX ADMIN — HYDROMORPHONE HYDROCHLORIDE 0.5 MILLIGRAM(S): 2 INJECTION INTRAMUSCULAR; INTRAVENOUS; SUBCUTANEOUS at 07:15

## 2019-02-06 RX ADMIN — HYDROMORPHONE HYDROCHLORIDE 0.5 MILLIGRAM(S): 2 INJECTION INTRAMUSCULAR; INTRAVENOUS; SUBCUTANEOUS at 21:00

## 2019-02-06 RX ADMIN — HYDROMORPHONE HYDROCHLORIDE 0.5 MILLIGRAM(S): 2 INJECTION INTRAMUSCULAR; INTRAVENOUS; SUBCUTANEOUS at 01:13

## 2019-02-06 RX ADMIN — HYDROMORPHONE HYDROCHLORIDE 0.5 MILLIGRAM(S): 2 INJECTION INTRAMUSCULAR; INTRAVENOUS; SUBCUTANEOUS at 12:08

## 2019-02-06 RX ADMIN — HYDROMORPHONE HYDROCHLORIDE 0.5 MILLIGRAM(S): 2 INJECTION INTRAMUSCULAR; INTRAVENOUS; SUBCUTANEOUS at 12:20

## 2019-02-06 RX ADMIN — AMLODIPINE BESYLATE 10 MILLIGRAM(S): 2.5 TABLET ORAL at 06:46

## 2019-02-06 RX ADMIN — Medication 2000 MILLIGRAM(S): at 06:45

## 2019-02-06 RX ADMIN — ATORVASTATIN CALCIUM 40 MILLIGRAM(S): 80 TABLET, FILM COATED ORAL at 21:00

## 2019-02-06 RX ADMIN — Medication 5 MILLIGRAM(S): at 21:29

## 2019-02-06 RX ADMIN — Medication 5 MILLIGRAM(S): at 17:33

## 2019-02-06 RX ADMIN — HYDROMORPHONE HYDROCHLORIDE 0.5 MILLIGRAM(S): 2 INJECTION INTRAMUSCULAR; INTRAVENOUS; SUBCUTANEOUS at 21:30

## 2019-02-06 RX ADMIN — HYDROMORPHONE HYDROCHLORIDE 0.5 MILLIGRAM(S): 2 INJECTION INTRAMUSCULAR; INTRAVENOUS; SUBCUTANEOUS at 01:30

## 2019-02-06 RX ADMIN — HYDROMORPHONE HYDROCHLORIDE 0.5 MILLIGRAM(S): 2 INJECTION INTRAMUSCULAR; INTRAVENOUS; SUBCUTANEOUS at 06:53

## 2019-02-06 RX ADMIN — HEPARIN SODIUM 5000 UNIT(S): 5000 INJECTION INTRAVENOUS; SUBCUTANEOUS at 21:00

## 2019-02-06 NOTE — DIETITIAN INITIAL EVALUATION ADULT. - OTHER INFO
77yo M h/o CKD stage 3, baseline Cr 2.2, HLD, HTN, Chronic Bronchitis, AAA now s/p open repair 2/5, POD1. HD stable. Pt NPO with +NGT to LIWS. Pt asking for water and food, reports good appetite PTA, limits salt in diet at home. Pt reports 5# wt loss in the last couple of weeks but denies change in PO intake. Pt denies N/V. Pain controlled. Last BM 2/5. Skin: surgical incision abdomen. Discussed diet advancement pending approval from medical team, with emphasis on adequate protein intake. Also discussed following heart healthy diet. Pt receptive to diet education. Of note, pt with hyperphosphatemia, will monitor need for dietary restriction and/or phos binder as diet advanced. RD to closely follow.

## 2019-02-06 NOTE — PROGRESS NOTE ADULT - SUBJECTIVE AND OBJECTIVE BOX
Patient is a 79yo man with kown CKD- creat about 2.1 range  now s/p transabdominal juxtarenal repair of AAA  aware , NC )2.  c/o some abd pain.  NAD  good urine output    PAST MEDICAL & SURGICAL HISTORY:  Spinal stenosis  AAA (abdominal aortic aneurysm)  CKD (chronic kidney disease) stage 3, GFR 30-59 ml/min  BPH (benign prostatic hyperplasia)  Hyperlipidemia  Hypertension  History of prostate surgery      MEDICATIONS  (STANDING):  amLODIPine   Tablet 10 milliGRAM(s) Oral daily  atorvastatin 40 milliGRAM(s) Oral at bedtime  heparin  Injectable 5000 Unit(s) SubCutaneous every 8 hours  insulin lispro (HumaLOG) corrective regimen sliding scale   SubCutaneous every 6 hours  lactated ringers. 1000 milliLiter(s) (125 mL/Hr) IV Continuous <Continuous>  metoprolol tartrate Injectable 5 milliGRAM(s) IV Push every 6 hours    MEDICATIONS  (PRN):  HYDROmorphone  Injectable 0.5 milliGRAM(s) IV Push every 3 hours PRN Moderate Pain (4 - 6)      Allergies    No Known Allergies            SOCIAL HISTORY: No smoke    FAMILY HISTORY:  Family history of abdominal aortic aneurysm (AAA) (Father): ruptured aaa father      T(C): , Max: 38 (02-06-19 @ 17:43)  T(F): , Max: 100.4 (02-06-19 @ 17:43)  HR: 102 (02-06-19 @ 15:00)  BP: 126/77 (02-06-19 @ 15:00)  BP(mean): 95 (02-06-19 @ 15:00)  RR: 24 (02-06-19 @ 15:00)  SpO2: 97% (02-06-19 @ 15:00)  Wt(kg): --    02-05 @ 07:01  -  02-06 @ 07:00  --------------------------------------------------------  IN:    IV PiggyBack: 150 mL    lactated ringers.: 2287 mL    niCARdipine Infusion: 15 mL  Total IN: 2452 mL    OUT:    Indwelling Catheter - Urethral: 1174 mL    Nasoenteral Tube: 150 mL  Total OUT: 1324 mL    Total NET: 1128 mL      02-06 @ 07:01  -  02-06 @ 17:55  --------------------------------------------------------  IN:    lactated ringers.: 150 mL    lactated ringers.: 625 mL  Total IN: 775 mL    OUT:    Indwelling Catheter - Urethral: 510 mL  Total OUT: 510 mL    Total NET: 265 mL            PHYSICAL EXAM:  Constitutional: Well appearing.  No acute distress  Back: No CVA tenderness  Respiratory: occ rhonchi  Cardiovascular: S1, S2.  Regular rate and rhythm.    Gastrointestinal: soft, some distention + tender   bandage clean  Extremities: Warm.  No lower extremity edema.    Neurological: No focal deficits.  Skin: Warm. Dry.    Psychiatric: Normal affect.        LABS:                        10.9   14.39 )-----------( 303      ( 06 Feb 2019 05:08 )             33.3     02-06    140  |  107  |  38<H>  ----------------------------<  149<H>  5.1   |  21<L>  |  2.21<H>    Ca    9.3      06 Feb 2019 05:08  Phos  5.2     02-06  Mg     1.7     02-06        PT/INR - ( 05 Feb 2019 16:51 )   PT: 12.9 sec;   INR: 1.14          PTT - ( 05 Feb 2019 16:51 )  PTT:46.6 sec          RADIOLOGY & ADDITIONAL STUDIES:

## 2019-02-06 NOTE — DIETITIAN INITIAL EVALUATION ADULT. - ENERGY NEEDS
Ht (2/5): 175.3cm, Wt (2/6 per pt): 75kg, IBW: 160# +/-10%, %IBW: 103%, BMI: 24.4  ABW used to calculate energy needs due to pt's current body weight within % IBW. Fluid needs per team 2/2 renal status.

## 2019-02-06 NOTE — PROGRESS NOTE ADULT - ATTENDING COMMENTS
77 yo man with CKD 3, creat 2.19 baseline now s/p AAA repair-  post op creatinine stable- will follow trend  mild hyperkalemia  non oliguric   c/w IVF-  if K rises any further would change LR to .9 NS or 1/2NS  keep net even to mild negative balance

## 2019-02-06 NOTE — PROGRESS NOTE ADULT - ASSESSMENT
79yo M h/o CKD stage 3, baseline Cr 2.2, HLD, HTN, Chronic Bronchitis, AAA now s/p open repair    Neuro: tylenole prn, dilaudid prn  CV: HD stable, SBP goals 110-150, cardene gtt as needed, norvasc PO in AM, lipitor  Pulm:satting well on NC, LR@125  GI/FEN: NPO/NGT to LIWS  : [place] for strict Is&Os  ID: Ancef post op (2/5-6)  Endo: iss  Heme: no chemo ppx  PPX: SCDs  Lines: PIVs, [place]  Wounds: midline incision  PT/OT: not ordered 77yo M h/o CKD stage 3, baseline Cr 2.2, HLD, HTN, Chronic Bronchitis, AAA now s/p open repair    Neuro: tylenole prn, dilaudid prn  CV: HD stable, SBP goals 110-150, cardene gtt as needed, norvasc PO in AM, lipitor. Dr. Jhaveri would like to keep HR < 100 so start lopressor 5 mg IV Q 6h  Pulm:satting well on NC, LR@125  GI/FEN: NPO/NGT to GALE  : [place] for strict Is&Os  ID: Ancef post op (2/5-6)  Endo: iss  Heme: no chemo ppx  PPX: SCDs  Lines: PIVs, Keisha  Wounds: midline incision  PT/OT: not ordered

## 2019-02-07 LAB
ANION GAP SERPL CALC-SCNC: 10 MMOL/L — SIGNIFICANT CHANGE UP (ref 5–17)
BUN SERPL-MCNC: 35 MG/DL — HIGH (ref 7–23)
CALCIUM SERPL-MCNC: 8.9 MG/DL — SIGNIFICANT CHANGE UP (ref 8.4–10.5)
CHLORIDE SERPL-SCNC: 106 MMOL/L — SIGNIFICANT CHANGE UP (ref 96–108)
CO2 SERPL-SCNC: 24 MMOL/L — SIGNIFICANT CHANGE UP (ref 22–31)
CREAT SERPL-MCNC: 2.38 MG/DL — HIGH (ref 0.5–1.3)
GLUCOSE BLDC GLUCOMTR-MCNC: 106 MG/DL — HIGH (ref 70–99)
GLUCOSE BLDC GLUCOMTR-MCNC: 114 MG/DL — HIGH (ref 70–99)
GLUCOSE BLDC GLUCOMTR-MCNC: 117 MG/DL — HIGH (ref 70–99)
GLUCOSE BLDC GLUCOMTR-MCNC: 125 MG/DL — HIGH (ref 70–99)
GLUCOSE SERPL-MCNC: 110 MG/DL — HIGH (ref 70–99)
HCT VFR BLD CALC: 29 % — LOW (ref 39–50)
HGB BLD-MCNC: 9.2 G/DL — LOW (ref 13–17)
MAGNESIUM SERPL-MCNC: 1.7 MG/DL — SIGNIFICANT CHANGE UP (ref 1.6–2.6)
MCHC RBC-ENTMCNC: 28.6 PG — SIGNIFICANT CHANGE UP (ref 27–34)
MCHC RBC-ENTMCNC: 31.7 GM/DL — LOW (ref 32–36)
MCV RBC AUTO: 90.1 FL — SIGNIFICANT CHANGE UP (ref 80–100)
NRBC # BLD: 0 /100 WBCS — SIGNIFICANT CHANGE UP (ref 0–0)
PHOSPHATE SERPL-MCNC: 3.6 MG/DL — SIGNIFICANT CHANGE UP (ref 2.5–4.5)
PLATELET # BLD AUTO: 266 K/UL — SIGNIFICANT CHANGE UP (ref 150–400)
POTASSIUM SERPL-MCNC: 4.5 MMOL/L — SIGNIFICANT CHANGE UP (ref 3.5–5.3)
POTASSIUM SERPL-SCNC: 4.5 MMOL/L — SIGNIFICANT CHANGE UP (ref 3.5–5.3)
RBC # BLD: 3.22 M/UL — LOW (ref 4.2–5.8)
RBC # FLD: 14.8 % — HIGH (ref 10.3–14.5)
SODIUM SERPL-SCNC: 140 MMOL/L — SIGNIFICANT CHANGE UP (ref 135–145)
WBC # BLD: 16.31 K/UL — HIGH (ref 3.8–10.5)
WBC # FLD AUTO: 16.31 K/UL — HIGH (ref 3.8–10.5)

## 2019-02-07 PROCEDURE — 71045 X-RAY EXAM CHEST 1 VIEW: CPT | Mod: 26

## 2019-02-07 PROCEDURE — 99232 SBSQ HOSP IP/OBS MODERATE 35: CPT | Mod: GC

## 2019-02-07 PROCEDURE — 99233 SBSQ HOSP IP/OBS HIGH 50: CPT | Mod: GC

## 2019-02-07 RX ORDER — METOPROLOL TARTRATE 50 MG
50 TABLET ORAL
Qty: 0 | Refills: 0 | Status: DISCONTINUED | OUTPATIENT
Start: 2019-02-07 | End: 2019-02-08

## 2019-02-07 RX ORDER — SODIUM CHLORIDE 9 MG/ML
1000 INJECTION, SOLUTION INTRAVENOUS
Qty: 0 | Refills: 0 | Status: DISCONTINUED | OUTPATIENT
Start: 2019-02-07 | End: 2019-02-08

## 2019-02-07 RX ORDER — SODIUM CHLORIDE 9 MG/ML
1000 INJECTION, SOLUTION INTRAVENOUS
Qty: 0 | Refills: 0 | Status: DISCONTINUED | OUTPATIENT
Start: 2019-02-07 | End: 2019-02-07

## 2019-02-07 RX ORDER — SODIUM CHLORIDE 9 MG/ML
500 INJECTION INTRAMUSCULAR; INTRAVENOUS; SUBCUTANEOUS ONCE
Qty: 0 | Refills: 0 | Status: COMPLETED | OUTPATIENT
Start: 2019-02-07 | End: 2019-02-07

## 2019-02-07 RX ADMIN — HYDROMORPHONE HYDROCHLORIDE 0.5 MILLIGRAM(S): 2 INJECTION INTRAMUSCULAR; INTRAVENOUS; SUBCUTANEOUS at 18:03

## 2019-02-07 RX ADMIN — SODIUM CHLORIDE 150 MILLILITER(S): 9 INJECTION, SOLUTION INTRAVENOUS at 16:39

## 2019-02-07 RX ADMIN — HEPARIN SODIUM 5000 UNIT(S): 5000 INJECTION INTRAVENOUS; SUBCUTANEOUS at 22:52

## 2019-02-07 RX ADMIN — HEPARIN SODIUM 5000 UNIT(S): 5000 INJECTION INTRAVENOUS; SUBCUTANEOUS at 14:16

## 2019-02-07 RX ADMIN — Medication 50 MILLIGRAM(S): at 14:17

## 2019-02-07 RX ADMIN — HYDROMORPHONE HYDROCHLORIDE 0.5 MILLIGRAM(S): 2 INJECTION INTRAMUSCULAR; INTRAVENOUS; SUBCUTANEOUS at 18:22

## 2019-02-07 RX ADMIN — SODIUM CHLORIDE 150 MILLILITER(S): 9 INJECTION, SOLUTION INTRAVENOUS at 22:52

## 2019-02-07 RX ADMIN — ATORVASTATIN CALCIUM 40 MILLIGRAM(S): 80 TABLET, FILM COATED ORAL at 22:52

## 2019-02-07 RX ADMIN — HEPARIN SODIUM 5000 UNIT(S): 5000 INJECTION INTRAVENOUS; SUBCUTANEOUS at 05:09

## 2019-02-07 RX ADMIN — Medication 10 MILLIGRAM(S): at 05:09

## 2019-02-07 RX ADMIN — SODIUM CHLORIDE 2000 MILLILITER(S): 9 INJECTION INTRAMUSCULAR; INTRAVENOUS; SUBCUTANEOUS at 16:39

## 2019-02-07 RX ADMIN — Medication 10 MILLIGRAM(S): at 00:21

## 2019-02-07 NOTE — PROGRESS NOTE ADULT - ASSESSMENT
79 yo man with CKD 3- now with rise in creatinine to 2.38 from 2.19 baseline;  s/p AAA repair-  agree with change to 1/2 NS  potassium level improved   Na and CO2 levels good  BP on low side  can keep even to mild + balance  reviewed with SICU team

## 2019-02-07 NOTE — PROVIDER CONTACT NOTE (OTHER) - ASSESSMENT
/72, HR 95 98% room air, 16 RR. Pt asymptomatic
Pt HR has been fluctuating between , /76, RR 15 sp02 93%. Pt asymptomatic.

## 2019-02-07 NOTE — PROVIDER CONTACT NOTE (OTHER) - ACTION/TREATMENT ORDERED:
PA aware No further actions ordered. Will continue to monitor.
MAJOR Mckinney aware. Will continue to monitor.

## 2019-02-07 NOTE — PROGRESS NOTE ADULT - ASSESSMENT
Neuro: tylenole prn, dilaudid prn  CV: HD stable, SBP goals 110-150, HR goal < 90 Metoprolol 10q6h, lipitor, NS@125  Pulm:satting well on NC,   GI/FEN: NPO except meds  : Voiding  ID: No abx  Endo: iss  Heme: sqh  PPX: SCDs, SQH  Lines: PIVs,   Wounds: midline incision A: s/p open repair of AAA with essential HTN, CKD 3      Neuro: tylenole prn, dilaudid prn  CV: HD stable, SBP goals 110-150, HR goal < 100 with Metoprolol 10 mg IV q6h, and for now holding his amlodipine; restarted lipitor, NS@125  Pulm:satting well on NC,   GI/FEN: NPO except meds  : Voiding  ID: No abx  Endo: iss  Heme: sqh  PPX: SCDs, SQH  Lines: PIVs,   Wounds: midline incision

## 2019-02-07 NOTE — PROGRESS NOTE ADULT - SUBJECTIVE AND OBJECTIVE BOX
Patient seen and examined at bedside.   Less abdominal pain today  rene out.  Awake and alert  good urine volume      T(C): , Max: 37.8 (02-07-19 @ 01:26)  T(F): , Max: 100.1 (02-07-19 @ 01:26)  HR: 84 (02-07-19 @ 18:00)  BP: 110/68 (02-07-19 @ 18:00)  BP(mean): 82 (02-07-19 @ 18:00)  RR: 18 (02-07-19 @ 18:00)  SpO2: 94% (02-07-19 @ 18:00)  Wt(kg): --    02-06 @ 07:01  -  02-07 @ 07:00  --------------------------------------------------------  IN:    lactated ringers.: 150 mL    lactated ringers.: 1625 mL    sodium chloride 0.9%: 1125 mL  Total IN: 2900 mL    OUT:    Indwelling Catheter - Urethral: 1605 mL    Nasoenteral Tube: 100 mL    Voided: 1000 mL  Total OUT: 2705 mL    Total NET: 195 mL      02-07 @ 07:01  -  02-07 @ 18:32  --------------------------------------------------------  IN:    IV PiggyBack: 500 mL  Total IN: 500 mL    OUT:    Voided: 1050 mL  Total OUT: 1050 mL    Total NET: -550 mL            atorvastatin 40 at bedtime  dextrose 5% + sodium chloride 0.45%. 1000 <Continuous>  heparin  Injectable 5000 every 8 hours  insulin lispro (HumaLOG) corrective regimen sliding scale  every 6 hours  metoprolol tartrate 50 two times a day    Allergies    No Known Allergies        PHYSICAL EXAM:  Constitutional:  No acute distress  Respiratory: Clear to auscultation   Cardiovascular: S1, S2.  Regular rate and rhythm.    Gastrointestinal: less distended, less tenderness; no rebound  Vasc/Extremities:  No lower extremity edema.    Neurological: No focal deficits.  Skin: Warm. Dry.    Psychiatric: Normal affect.        LABS:                        9.2    16.31 )-----------( 266      ( 07 Feb 2019 05:29 )             29.0     02-07    140  |  106  |  35<H>  ----------------------------<  110<H>  4.5   |  24  |  2.38<H>    Ca    8.9      07 Feb 2019 05:30  Phos  3.6     02-07  Mg     1.7     02-07                  RADIOLOGY & ADDITIONAL STUDIES:

## 2019-02-07 NOTE — PROGRESS NOTE ADULT - SUBJECTIVE AND OBJECTIVE BOX
SUBJECTIVE: No acute events overnight. Pain controlled. Denies n/v/CP/SOB. No BMs or flatus. Fever 100.4F, likely atelectasis. Lowell stark Passed TOV.    ICU Vital Signs Last 24 Hrs  T(C): 37.4 (07 Feb 2019 09:04), Max: 38 (06 Feb 2019 17:43)  T(F): 99.4 (07 Feb 2019 09:04), Max: 100.4 (06 Feb 2019 17:43)  HR: 100 (07 Feb 2019 12:00) (80 - 104)  BP: 139/75 (07 Feb 2019 12:00) (96/63 - 151/81)  BP(mean): 103 (07 Feb 2019 12:00) (75 - 121)  ABP: --  ABP(mean): --  RR: 31 (07 Feb 2019 12:00) (13 - 34)  SpO2: 94% (07 Feb 2019 12:00) (92% - 97%)      Physical Exam:  General: NAD  Pulmonary: Nonlabored breathing, no respiratory distress, CTA-B  Cardiovascular: RRR, no murmurs  Pulm: CTA b/l, b/l lower lobes with decreased breath sounds  Abdominal: soft, appropriately tender to palpation over surgical incision, mild distension  Extremities: WWP, 5/5 strength x 4, no clubbing/cyanosis/edema  Neuro: A/O x3, normal motor/sensation, no focal deficits  Pulses: palpable distal pulses      I&O's Summary    06 Feb 2019 07:01  -  07 Feb 2019 07:00  --------------------------------------------------------  IN: 2900 mL / OUT: 2705 mL / NET: 195 mL    07 Feb 2019 07:01  -  07 Feb 2019 13:04  --------------------------------------------------------  IN: 0 mL / OUT: 550 mL / NET: -550 mL        LABS:                        9.2    16.31 )-----------( 266      ( 07 Feb 2019 05:29 )             29.0     02-07    140  |  106  |  35<H>  ----------------------------<  110<H>  4.5   |  24  |  2.38<H>    Ca    8.9      07 Feb 2019 05:30  Phos  3.6     02-07  Mg     1.7     02-07      PT/INR - ( 05 Feb 2019 16:51 )   PT: 12.9 sec;   INR: 1.14          PTT - ( 05 Feb 2019 16:51 )  PTT:46.6 sec    CAPILLARY BLOOD GLUCOSE      POCT Blood Glucose.: 125 mg/dL (07 Feb 2019 11:32)  POCT Blood Glucose.: 117 mg/dL (07 Feb 2019 06:34)  POCT Blood Glucose.: 113 mg/dL (06 Feb 2019 23:27)  POCT Blood Glucose.: 114 mg/dL (06 Feb 2019 18:35)        Cultures:    Drips:    RADIOLOGY & ADDITIONAL STUDIEs SUBJECTIVE: No acute events overnight. Pain controlled. Denies n/v/CP/SOB. No BMs or flatus. Fever 100.4F, likely atelectasis. Lowell stark Passed TOV.    ICU Vital Signs Last 24 Hrs  T(C): 37.4 (07 Feb 2019 09:04), Max: 38 (06 Feb 2019 17:43)  T(F): 99.4 (07 Feb 2019 09:04), Max: 100.4 (06 Feb 2019 17:43)  HR: 100 (07 Feb 2019 12:00) (80 - 104)  BP: 139/75 (07 Feb 2019 12:00) (96/63 - 151/81)  BP(mean): 103 (07 Feb 2019 12:00) (75 - 121)  ABP: --  ABP(mean): --  RR: 31 (07 Feb 2019 12:00) (13 - 34)  SpO2: 94% (07 Feb 2019 12:00) (92% - 97%)      Physical Exam:  General: NAD  HEENT: PERRL, EOMI, MMM  Pulmonary: Nonlabored breathing, no respiratory distress, CTA-B  Cardiovascular: RRR, no murmurs  Pulm: CTA b/l, b/l lower lobes with decreased breath sounds  Abdominal: soft, appropriately tender to palpation over surgical incision, mild distension  Extremities: WWP, 5/5 strength x 4, no clubbing/cyanosis/edema  Neuro: A/O x3, normal motor/sensation, no focal deficits  Pulses: palpable distal pulses  Skin: no rash  MSK: no joint swelling      I&O's Summary    06 Feb 2019 07:01  -  07 Feb 2019 07:00  --------------------------------------------------------  IN: 2900 mL / OUT: 2705 mL / NET: 195 mL    07 Feb 2019 07:01  -  07 Feb 2019 13:04  --------------------------------------------------------  IN: 0 mL / OUT: 550 mL / NET: -550 mL        LABS:                        9.2    16.31 )-----------( 266      ( 07 Feb 2019 05:29 )             29.0     02-07    140  |  106  |  35<H>  ----------------------------<  110<H>  4.5   |  24  |  2.38<H>    Ca    8.9      07 Feb 2019 05:30  Phos  3.6     02-07  Mg     1.7     02-07      PT/INR - ( 05 Feb 2019 16:51 )   PT: 12.9 sec;   INR: 1.14          PTT - ( 05 Feb 2019 16:51 )  PTT:46.6 sec    CAPILLARY BLOOD GLUCOSE      POCT Blood Glucose.: 125 mg/dL (07 Feb 2019 11:32)  POCT Blood Glucose.: 117 mg/dL (07 Feb 2019 06:34)  POCT Blood Glucose.: 113 mg/dL (06 Feb 2019 23:27)  POCT Blood Glucose.: 114 mg/dL (06 Feb 2019 18:35)        Cultures:    Drips:    RADIOLOGY & ADDITIONAL STUDIEs

## 2019-02-08 LAB
ANION GAP SERPL CALC-SCNC: 10 MMOL/L — SIGNIFICANT CHANGE UP (ref 5–17)
APPEARANCE UR: CLEAR — SIGNIFICANT CHANGE UP
BILIRUB UR-MCNC: NEGATIVE — SIGNIFICANT CHANGE UP
BUN SERPL-MCNC: 29 MG/DL — HIGH (ref 7–23)
CALCIUM SERPL-MCNC: 8.8 MG/DL — SIGNIFICANT CHANGE UP (ref 8.4–10.5)
CHLORIDE SERPL-SCNC: 107 MMOL/L — SIGNIFICANT CHANGE UP (ref 96–108)
CO2 SERPL-SCNC: 24 MMOL/L — SIGNIFICANT CHANGE UP (ref 22–31)
COLOR SPEC: YELLOW — SIGNIFICANT CHANGE UP
CREAT SERPL-MCNC: 2.32 MG/DL — HIGH (ref 0.5–1.3)
DIFF PNL FLD: ABNORMAL
GLUCOSE BLDC GLUCOMTR-MCNC: 101 MG/DL — HIGH (ref 70–99)
GLUCOSE BLDC GLUCOMTR-MCNC: 138 MG/DL — HIGH (ref 70–99)
GLUCOSE BLDC GLUCOMTR-MCNC: 223 MG/DL — HIGH (ref 70–99)
GLUCOSE SERPL-MCNC: 117 MG/DL — HIGH (ref 70–99)
GLUCOSE UR QL: NEGATIVE — SIGNIFICANT CHANGE UP
HCT VFR BLD CALC: 29.7 % — LOW (ref 39–50)
HGB BLD-MCNC: 9.4 G/DL — LOW (ref 13–17)
KETONES UR-MCNC: NEGATIVE — SIGNIFICANT CHANGE UP
LEUKOCYTE ESTERASE UR-ACNC: NEGATIVE — SIGNIFICANT CHANGE UP
MAGNESIUM SERPL-MCNC: 2 MG/DL — SIGNIFICANT CHANGE UP (ref 1.6–2.6)
MCHC RBC-ENTMCNC: 29.2 PG — SIGNIFICANT CHANGE UP (ref 27–34)
MCHC RBC-ENTMCNC: 31.6 GM/DL — LOW (ref 32–36)
MCV RBC AUTO: 92.2 FL — SIGNIFICANT CHANGE UP (ref 80–100)
NITRITE UR-MCNC: NEGATIVE — SIGNIFICANT CHANGE UP
NRBC # BLD: 0 /100 WBCS — SIGNIFICANT CHANGE UP (ref 0–0)
PH UR: 6 — SIGNIFICANT CHANGE UP (ref 5–8)
PHOSPHATE SERPL-MCNC: 2.5 MG/DL — SIGNIFICANT CHANGE UP (ref 2.5–4.5)
PLATELET # BLD AUTO: 279 K/UL — SIGNIFICANT CHANGE UP (ref 150–400)
POTASSIUM SERPL-MCNC: 3.8 MMOL/L — SIGNIFICANT CHANGE UP (ref 3.5–5.3)
POTASSIUM SERPL-SCNC: 3.8 MMOL/L — SIGNIFICANT CHANGE UP (ref 3.5–5.3)
PROT UR-MCNC: 30 MG/DL
RBC # BLD: 3.22 M/UL — LOW (ref 4.2–5.8)
RBC # FLD: 14.3 % — SIGNIFICANT CHANGE UP (ref 10.3–14.5)
SODIUM SERPL-SCNC: 141 MMOL/L — SIGNIFICANT CHANGE UP (ref 135–145)
SP GR SPEC: 1.01 — SIGNIFICANT CHANGE UP (ref 1–1.03)
SURGICAL PATHOLOGY STUDY: SIGNIFICANT CHANGE UP
UROBILINOGEN FLD QL: 0.2 E.U./DL — SIGNIFICANT CHANGE UP
WBC # BLD: 14.69 K/UL — HIGH (ref 3.8–10.5)
WBC # FLD AUTO: 14.69 K/UL — HIGH (ref 3.8–10.5)

## 2019-02-08 RX ORDER — SODIUM CHLORIDE 9 MG/ML
1000 INJECTION, SOLUTION INTRAVENOUS
Qty: 0 | Refills: 0 | Status: DISCONTINUED | OUTPATIENT
Start: 2019-02-08 | End: 2019-02-09

## 2019-02-08 RX ORDER — OXYCODONE AND ACETAMINOPHEN 5; 325 MG/1; MG/1
1 TABLET ORAL EVERY 6 HOURS
Qty: 0 | Refills: 0 | Status: DISCONTINUED | OUTPATIENT
Start: 2019-02-08 | End: 2019-02-09

## 2019-02-08 RX ORDER — AMLODIPINE BESYLATE 2.5 MG/1
10 TABLET ORAL DAILY
Qty: 0 | Refills: 0 | Status: DISCONTINUED | OUTPATIENT
Start: 2019-02-08 | End: 2019-02-09

## 2019-02-08 RX ORDER — SODIUM CHLORIDE 9 MG/ML
1000 INJECTION, SOLUTION INTRAVENOUS
Qty: 0 | Refills: 0 | Status: DISCONTINUED | OUTPATIENT
Start: 2019-02-08 | End: 2019-02-08

## 2019-02-08 RX ORDER — OXYCODONE AND ACETAMINOPHEN 5; 325 MG/1; MG/1
2 TABLET ORAL EVERY 6 HOURS
Qty: 0 | Refills: 0 | Status: DISCONTINUED | OUTPATIENT
Start: 2019-02-08 | End: 2019-02-09

## 2019-02-08 RX ADMIN — OXYCODONE AND ACETAMINOPHEN 1 TABLET(S): 5; 325 TABLET ORAL at 15:43

## 2019-02-08 RX ADMIN — AMLODIPINE BESYLATE 10 MILLIGRAM(S): 2.5 TABLET ORAL at 11:49

## 2019-02-08 RX ADMIN — SODIUM CHLORIDE 150 MILLILITER(S): 9 INJECTION, SOLUTION INTRAVENOUS at 05:36

## 2019-02-08 RX ADMIN — SODIUM CHLORIDE 80 MILLILITER(S): 9 INJECTION, SOLUTION INTRAVENOUS at 14:50

## 2019-02-08 RX ADMIN — Medication 4: at 11:49

## 2019-02-08 RX ADMIN — HEPARIN SODIUM 5000 UNIT(S): 5000 INJECTION INTRAVENOUS; SUBCUTANEOUS at 05:36

## 2019-02-08 RX ADMIN — HEPARIN SODIUM 5000 UNIT(S): 5000 INJECTION INTRAVENOUS; SUBCUTANEOUS at 21:16

## 2019-02-08 RX ADMIN — Medication 50 MILLIGRAM(S): at 05:36

## 2019-02-08 RX ADMIN — ATORVASTATIN CALCIUM 40 MILLIGRAM(S): 80 TABLET, FILM COATED ORAL at 21:16

## 2019-02-08 RX ADMIN — HEPARIN SODIUM 5000 UNIT(S): 5000 INJECTION INTRAVENOUS; SUBCUTANEOUS at 14:51

## 2019-02-08 RX ADMIN — OXYCODONE AND ACETAMINOPHEN 1 TABLET(S): 5; 325 TABLET ORAL at 14:51

## 2019-02-08 NOTE — PHYSICAL THERAPY INITIAL EVALUATION ADULT - ADDITIONAL COMMENTS
Patient reports previously independent with all ADLs/IADLs prior to admission. No HHA. Denies history of mechanical falls. Reports wife is in good functional health (no falls, no AD).

## 2019-02-08 NOTE — PHYSICAL THERAPY INITIAL EVALUATION ADULT - GENERAL OBSERVATIONS, REHAB EVAL
Chart reviewed. IE Completed. Patient without complaints of pain at rest, agreeable to PT. Patient received OOB in chair, NAD, +tele, +(L)IV, +abd incision C/D/I, VERONICA Anne cleared patient for treatment.

## 2019-02-08 NOTE — PHYSICAL THERAPY INITIAL EVALUATION ADULT - GAIT DEVIATIONS NOTED, PT EVAL
appropriate kehinde/step length, steady gait, no LOB noted, appropriate negotiation through hallway obstacles without gait disturbances noted

## 2019-02-08 NOTE — PHYSICAL THERAPY INITIAL EVALUATION ADULT - PERTINENT HX OF CURRENT PROBLEM, REHAB EVAL
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 Please refer to H&P on [place] for remaining.

## 2019-02-08 NOTE — PROGRESS NOTE ADULT - SUBJECTIVE AND OBJECTIVE BOX
ON : ISAIAH, VSS, stepped down from SICU                                A: s/p open repair of AAA with essential HTN, CKD 3      Neuro: tylenole prn, dilaudid prn  CV: HD stable, SBP goals 110-150, HR goal < 100 with Metoprolol 10 mg IV q6h, and for now holding his amlodipine; restarted lipitor, NS@125  Pulm:satting well on NC,   GI/FEN: NPO except meds  : Voiding  ID: No abx  Endo: iss  Heme: sq ON : COMFORT COLON, stepped down from SICU    SUBJECTIVE: Pt seen and examined at bedside. Pain is well controlled. Denies chest pain, SOB. Denies nausea, emesis. Passing flatus. Making appropriate UO.      amLODIPine   Tablet 10 milliGRAM(s) Oral daily  heparin  Injectable 5000 Unit(s) SubCutaneous every 8 hours      Vital Signs Last 24 Hrs  T(C): 37.1 (08 Feb 2019 17:38), Max: 37.5 (07 Feb 2019 22:13)  T(F): 98.7 (08 Feb 2019 17:38), Max: 99.5 (07 Feb 2019 22:13)  HR: 82 (08 Feb 2019 17:50) (72 - 84)  BP: 120/62 (08 Feb 2019 17:50) (113/62 - 137/72)  BP(mean): 86 (08 Feb 2019 17:50) (83 - 96)  RR: 18 (08 Feb 2019 17:50) (10 - 20)  SpO2: 97% (08 Feb 2019 17:50) (94% - 98%)    General: NAD, resting comfortably in bed  Pulm: Nonlabored breathing, no respiratory distress  Abdominal: soft, appropriately tender to palpation over surgical incision, mild distension  Extremities: WWP, 5/5 strength x 4, no clubbing/cyanosis/edema  Extrem: WWP, no edema, SCDs in place      LABS:                        9.4    14.69 )-----------( 279      ( 08 Feb 2019 07:10 )             29.7     02-08    141  |  107  |  29<H>  ----------------------------<  117<H>  3.8   |  24  |  2.32<H>    Ca    8.8      08 Feb 2019 07:10  Phos  2.5     02-08  Mg     2.0     02-08

## 2019-02-08 NOTE — PROGRESS NOTE ADULT - ASSESSMENT
77yo M h/o CKD stage 3, baseline Cr 2.2, HLD, HTN, Chronic Bronchitis, AAA now s/p open repair    HD stable, SBP goals 110-150, HR goal < 100 with Metoprolol 10 mg IV q6h, and for now holding his amlodipine; restarted lipitor, NS@125  ISS  Encourage OOB/IS  GI/FEN: CLD  SQH, SCDs

## 2019-02-09 ENCOUNTER — TRANSCRIPTION ENCOUNTER (OUTPATIENT)
Age: 79
End: 2019-02-09

## 2019-02-09 VITALS — TEMPERATURE: 99 F

## 2019-02-09 LAB
ANION GAP SERPL CALC-SCNC: 10 MMOL/L — SIGNIFICANT CHANGE UP (ref 5–17)
BUN SERPL-MCNC: 24 MG/DL — HIGH (ref 7–23)
CALCIUM SERPL-MCNC: 8.4 MG/DL — SIGNIFICANT CHANGE UP (ref 8.4–10.5)
CHLORIDE SERPL-SCNC: 104 MMOL/L — SIGNIFICANT CHANGE UP (ref 96–108)
CO2 SERPL-SCNC: 23 MMOL/L — SIGNIFICANT CHANGE UP (ref 22–31)
CREAT SERPL-MCNC: 2.2 MG/DL — HIGH (ref 0.5–1.3)
GLUCOSE SERPL-MCNC: 134 MG/DL — HIGH (ref 70–99)
HCT VFR BLD CALC: 27.7 % — LOW (ref 39–50)
HGB BLD-MCNC: 8.9 G/DL — LOW (ref 13–17)
MAGNESIUM SERPL-MCNC: 1.9 MG/DL — SIGNIFICANT CHANGE UP (ref 1.6–2.6)
MCHC RBC-ENTMCNC: 29 PG — SIGNIFICANT CHANGE UP (ref 27–34)
MCHC RBC-ENTMCNC: 32.1 GM/DL — SIGNIFICANT CHANGE UP (ref 32–36)
MCV RBC AUTO: 90.2 FL — SIGNIFICANT CHANGE UP (ref 80–100)
NRBC # BLD: 0 /100 WBCS — SIGNIFICANT CHANGE UP (ref 0–0)
PHOSPHATE SERPL-MCNC: 2.2 MG/DL — LOW (ref 2.5–4.5)
PLATELET # BLD AUTO: 277 K/UL — SIGNIFICANT CHANGE UP (ref 150–400)
POTASSIUM SERPL-MCNC: 3.7 MMOL/L — SIGNIFICANT CHANGE UP (ref 3.5–5.3)
POTASSIUM SERPL-SCNC: 3.7 MMOL/L — SIGNIFICANT CHANGE UP (ref 3.5–5.3)
RBC # BLD: 3.07 M/UL — LOW (ref 4.2–5.8)
RBC # FLD: 13.9 % — SIGNIFICANT CHANGE UP (ref 10.3–14.5)
SODIUM SERPL-SCNC: 137 MMOL/L — SIGNIFICANT CHANGE UP (ref 135–145)
WBC # BLD: 10.7 K/UL — HIGH (ref 3.8–10.5)
WBC # FLD AUTO: 10.7 K/UL — HIGH (ref 3.8–10.5)

## 2019-02-09 RX ORDER — POTASSIUM CHLORIDE 20 MEQ
20 PACKET (EA) ORAL ONCE
Qty: 0 | Refills: 0 | Status: COMPLETED | OUTPATIENT
Start: 2019-02-09 | End: 2019-02-09

## 2019-02-09 RX ORDER — SODIUM,POTASSIUM PHOSPHATES 278-250MG
1 POWDER IN PACKET (EA) ORAL
Qty: 0 | Refills: 0 | Status: COMPLETED | OUTPATIENT
Start: 2019-02-09 | End: 2019-02-09

## 2019-02-09 RX ADMIN — Medication 1 PACKET(S): at 09:04

## 2019-02-09 RX ADMIN — Medication 10 MILLIGRAM(S): at 11:16

## 2019-02-09 RX ADMIN — Medication 1 PACKET(S): at 11:16

## 2019-02-09 RX ADMIN — Medication 1 PACKET(S): at 13:08

## 2019-02-09 RX ADMIN — Medication 20 MILLIEQUIVALENT(S): at 08:06

## 2019-02-09 RX ADMIN — SODIUM CHLORIDE 125 MILLILITER(S): 9 INJECTION, SOLUTION INTRAVENOUS at 05:11

## 2019-02-09 RX ADMIN — HEPARIN SODIUM 5000 UNIT(S): 5000 INJECTION INTRAVENOUS; SUBCUTANEOUS at 05:09

## 2019-02-09 RX ADMIN — HEPARIN SODIUM 5000 UNIT(S): 5000 INJECTION INTRAVENOUS; SUBCUTANEOUS at 14:12

## 2019-02-09 RX ADMIN — OXYCODONE AND ACETAMINOPHEN 1 TABLET(S): 5; 325 TABLET ORAL at 16:24

## 2019-02-09 RX ADMIN — AMLODIPINE BESYLATE 10 MILLIGRAM(S): 2.5 TABLET ORAL at 05:09

## 2019-02-09 NOTE — DISCHARGE NOTE ADULT - CARE PLAN
Principal Discharge DX:	Abdominal aortic aneurysm (AAA) without rupture  Goal:	Follow-up  Assessment and plan of treatment:	Follow up with Dr. Jhaveri in 1-2 weeks. Call the office at  to schedule your appointment. You may shower; soap and water over incision sites. Do not scrub. Pat dry when done. Keep incision sites out of the sun as scars will darken. Ambulate as tolerated, but no heavy lifting (>10lbs) or strenuous exercise. You may resume regular diet. You should be urinating at least 3-4x per day. Call the office if you experience increasing pain, nausea, vomiting, swelling, redness, or drainage from incision site, temperature >101.4F  Goal:	Pain control  Assessment and plan of treatment:	You will be prescribed a short supply of Percocet. Please only use for severe pain. Otherwise, use Tylenol.

## 2019-02-09 NOTE — DISCHARGE NOTE ADULT - HOSPITAL COURSE
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 The aneurysm was incidentally found during imagining for back pain. He was seen by Dr. Hinojosa in [facility] and referred for further evaluation to dr. Cunha. He went to the OR on 2/5/19 for an open repair. he tolerated the procedure well, but was sent to the SICU postoperatively for monitoring. He came out of the OR with an NGT that was eventually discontinued. While in the SICU, he had some blood pressure issues that were treated and have since resolved. His NGT was dc'd, his rene removed. He was stepped down to a telemetry bed. His diet was advanced as tolerated. On day of discharge, he is afebrile, VSS. He is having bowel function. He will be discharged with plans for outpatient follow-up. He will not be discharged with ASA as he has a history of bleeding.

## 2019-02-09 NOTE — DISCHARGE NOTE ADULT - PLAN OF CARE
Follow-up Follow up with Dr. Jhaveri in 1-2 weeks. Call the office at  to schedule your appointment. You may shower; soap and water over incision sites. Do not scrub. Pat dry when done. Keep incision sites out of the sun as scars will darken. Ambulate as tolerated, but no heavy lifting (>10lbs) or strenuous exercise. You may resume regular diet. You should be urinating at least 3-4x per day. Call the office if you experience increasing pain, nausea, vomiting, swelling, redness, or drainage from incision site, temperature >101.4F Pain control You will be prescribed a short supply of Percocet. Please only use for severe pain. Otherwise, use Tylenol.

## 2019-02-09 NOTE — DISCHARGE NOTE ADULT - PATIENT PORTAL LINK FT
You can access the [facility] Portal, offered by [facility], by registering with the following website: [email or web address]

## 2019-02-09 NOTE — PROGRESS NOTE ADULT - SUBJECTIVE AND OBJECTIVE BOX
O/N: ISAIAH, VSS, UA->neg                                  77yo M h/o CKD stage 3, baseline Cr 2.2, HLD, HTN, Chronic Bronchitis, AAA now s/p open repair    HD stable, SBP goals 110-150, HR goal < 100 with Metoprolol 10 mg IV q6h, and for now holding his amlodipine; restarted lipitor, NS@125  ISS  Encourage OOB/IS  GI/FEN: CLD  SQH, SCDs 24 hr events  O/N: ISAIAH, VSS, UA->neg    Subjective:  Denies pain. Has not had BM. Tolerating CLD. Wants to go home.    Vital Signs Last 24 Hrs  T(C): 37.1 (2019 09:00), Max: 37.1 (2019 14:25)  T(F): 98.7 (2019 09:00), Max: 98.7 (2019 14:25)  HR: 84 (2019 12:10) (72 - 84)  BP: 141/72 (2019 12:10) (120/62 - 150/73)  BP(mean): 98 (2019 12:10) (86 - 102)  RR: 16 (2019 08:25) (16 - 18)  SpO2: 98% (2019 08:25) (97% - 98%)    I&O's Summary  2019 07:01  -  2019 07:00  --------------------------------------------------------  IN: 3200 mL / OUT: 3500 mL / NET: -300 mL    2019 07:01  -  2019 12:55  --------------------------------------------------------  IN: 730 mL / OUT: 600 mL / NET: 130 mL    Physical Exam:  General: NAD, resting comfortably  Pulmonary: normal resp effort  Abdominal: soft, NT/ND; midline incision c/d/i  Neuro: A/O x 3    LABS:                8.9    10.70 )-----------( 277      ( 2019 06:42 )             27.7     02  137  |  104  |  24<H>  ----------------------------<  134<H>  3.7   |  23  |  2.20<H>    Ca    8.4      2019 06:42  Phos  2.2     02-  Mg     1.9     -    Urinalysis Basic - ( 2019 17:59 )  Color: Yellow / Appearance: Clear / S.015 / pH: x  Gluc: x / Ketone: NEGATIVE  / Bili: Negative / Urobili: 0.2 E.U./dL   Blood: x / Protein: 30 mg/dL / Nitrite: NEGATIVE   Leuk Esterase: NEGATIVE / RBC: < 5 /HPF / WBC < 5 /HPF   Sq Epi: x / Non Sq Epi: 0-5 /HPF / Bacteria: Present /HPF    CAPILLARY BLOOD GLUCOSE  POCT Blood Glucose.: 101 mg/dL (2019 16:39)        79yo M h/o CKD stage 3, baseline Cr 2.2, HLD, HTN, Chronic Bronchitis, AAA now s/p open repair    - Pain/nausea control PRN  - Advanced to regular diet; heplock  - Home meds  - SQH, SCDs  - OOB/IS  - Dispo: Possible home today if tolerating diet

## 2019-02-09 NOTE — DISCHARGE NOTE ADULT - CARE PROVIDER_API CALL
Wilfred Jhaveri)  Vascular Surgery  [address]  [place], [place] [address]  Phone: [phone]  Fax: [phone]  Follow Up Time:

## 2019-02-11 ENCOUNTER — RX RENEWAL (OUTPATIENT)
Age: 79
End: 2019-02-11

## 2019-02-11 DIAGNOSIS — K59.00 CONSTIPATION, UNSPECIFIED: ICD-10-CM

## 2019-02-11 PROBLEM — N18.3 CHRONIC KIDNEY DISEASE, STAGE 3 (MODERATE): Chronic | Status: ACTIVE | Noted: 2019-02-05

## 2019-02-11 PROBLEM — M48.00 SPINAL STENOSIS, SITE UNSPECIFIED: Chronic | Status: ACTIVE | Noted: 2019-02-05

## 2019-02-11 PROBLEM — I71.4 ABDOMINAL AORTIC ANEURYSM, WITHOUT RUPTURE: Chronic | Status: ACTIVE | Noted: 2019-02-05

## 2019-02-11 PROBLEM — N40.0 BENIGN PROSTATIC HYPERPLASIA WITHOUT LOWER URINARY TRACT SYMPTOMS: Chronic | Status: ACTIVE | Noted: 2019-02-05

## 2019-02-11 PROBLEM — I10 ESSENTIAL (PRIMARY) HYPERTENSION: Chronic | Status: ACTIVE | Noted: 2019-02-04

## 2019-02-11 PROBLEM — E78.5 HYPERLIPIDEMIA, UNSPECIFIED: Chronic | Status: ACTIVE | Noted: 2019-02-04

## 2019-02-13 DIAGNOSIS — N18.3 CHRONIC KIDNEY DISEASE, STAGE 3 (MODERATE): ICD-10-CM

## 2019-02-13 DIAGNOSIS — Z79.899 OTHER LONG TERM (CURRENT) DRUG THERAPY: ICD-10-CM

## 2019-02-13 DIAGNOSIS — M48.00 SPINAL STENOSIS, SITE UNSPECIFIED: ICD-10-CM

## 2019-02-13 DIAGNOSIS — E78.5 HYPERLIPIDEMIA, UNSPECIFIED: ICD-10-CM

## 2019-02-13 DIAGNOSIS — I71.4 ABDOMINAL AORTIC ANEURYSM, WITHOUT RUPTURE: ICD-10-CM

## 2019-02-13 DIAGNOSIS — N40.0 BENIGN PROSTATIC HYPERPLASIA WITHOUT LOWER URINARY TRACT SYMPTOMS: ICD-10-CM

## 2019-02-13 DIAGNOSIS — J42 UNSPECIFIED CHRONIC BRONCHITIS: ICD-10-CM

## 2019-02-13 DIAGNOSIS — I12.9 HYPERTENSIVE CHRONIC KIDNEY DISEASE WITH STAGE 1 THROUGH STAGE 4 CHRONIC KIDNEY DISEASE, OR UNSPECIFIED CHRONIC KIDNEY DISEASE: ICD-10-CM

## 2019-02-15 ENCOUNTER — APPOINTMENT (OUTPATIENT)
Dept: VASCULAR SURGERY | Facility: CLINIC | Age: 79
End: 2019-02-15
Payer: MEDICARE

## 2019-02-15 VITALS — SYSTOLIC BLOOD PRESSURE: 127 MMHG | DIASTOLIC BLOOD PRESSURE: 75 MMHG

## 2019-02-15 PROCEDURE — 99024 POSTOP FOLLOW-UP VISIT: CPT

## 2019-02-26 PROCEDURE — 97161 PT EVAL LOW COMPLEX 20 MIN: CPT

## 2019-02-26 PROCEDURE — 85025 COMPLETE CBC W/AUTO DIFF WBC: CPT

## 2019-02-26 PROCEDURE — C1768: CPT

## 2019-02-26 PROCEDURE — 85018 HEMOGLOBIN: CPT

## 2019-02-26 PROCEDURE — 86901 BLOOD TYPING SEROLOGIC RH(D): CPT

## 2019-02-26 PROCEDURE — 84132 ASSAY OF SERUM POTASSIUM: CPT

## 2019-02-26 PROCEDURE — 81001 URINALYSIS AUTO W/SCOPE: CPT

## 2019-02-26 PROCEDURE — 86923 COMPATIBILITY TEST ELECTRIC: CPT

## 2019-02-26 PROCEDURE — 85730 THROMBOPLASTIN TIME PARTIAL: CPT

## 2019-02-26 PROCEDURE — 86850 RBC ANTIBODY SCREEN: CPT

## 2019-02-26 PROCEDURE — 85610 PROTHROMBIN TIME: CPT

## 2019-02-26 PROCEDURE — 88304 TISSUE EXAM BY PATHOLOGIST: CPT

## 2019-02-26 PROCEDURE — 71045 X-RAY EXAM CHEST 1 VIEW: CPT

## 2019-02-26 PROCEDURE — 82962 GLUCOSE BLOOD TEST: CPT

## 2019-02-26 PROCEDURE — 85027 COMPLETE CBC AUTOMATED: CPT

## 2019-02-26 PROCEDURE — 82330 ASSAY OF CALCIUM: CPT

## 2019-02-26 PROCEDURE — 83735 ASSAY OF MAGNESIUM: CPT

## 2019-02-26 PROCEDURE — 80048 BASIC METABOLIC PNL TOTAL CA: CPT

## 2019-02-26 PROCEDURE — 84100 ASSAY OF PHOSPHORUS: CPT

## 2019-02-26 PROCEDURE — 86900 BLOOD TYPING SEROLOGIC ABO: CPT

## 2019-02-26 PROCEDURE — 36415 COLL VENOUS BLD VENIPUNCTURE: CPT

## 2019-02-26 PROCEDURE — 84295 ASSAY OF SERUM SODIUM: CPT

## 2019-03-01 NOTE — ASSESSMENT
[FreeTextEntry1] : 79 y/o M with PMH of HTN, HLD, CKD III, active smoker, and known infrarenal AAA (5.2cm) now s/p open AAA repair on 2/5/2019. Pt doing well and recovering progressively as excepted after open AAA repair however, he is complaining of mild abdominal discomfort, low appetite and PO intake. \par \par Plan:\par -Maintain well hydrated and boost nutritional status with Ensure/Boots\par -Obtain CMP at [facility]'s ordered placed \par -Encouraged walking as much as tolerated and progressively. No exercising like golfing/bench presses/crunches that would put stress on abdominal incision  \par -No driving or traveling until next appointment \par -Follow-up in 3 weeks

## 2019-03-01 NOTE — REVIEW OF SYSTEMS
[Feeling Tired] : feeling tired [Negative] : Heme/Lymph [Fever] : no fever [Chills] : no chills [Vomiting] : no vomiting [Diarrhea] : no diarrhea [FreeTextEntry7] : Abdominal discomfort. Low PO intake. Last BM this AM.  [de-identified] : Except surgical incision

## 2019-03-01 NOTE — REASON FOR VISIT
[Spouse] : spouse [de-identified] : Open Aortic Aneurysm Repair [de-identified] : 2/5/2019 [de-identified] : 77 y/o M, [employer] employee, presents to the office today for postoperative evaluation s/p open AAA repair on 2019 for treatment of 5.2cm AAA. He is accompanied by wife. Other PMH includes HTN, HLD, CKD Stage 3 (Cr 2.2), active daily cigar smoker (1 per day), and PSH of knee replacement and prostate surgery for BPH.\par Since surgery, pt reports feeling more weak and tired than usual but gaining strength each day. Appetite is low and reports mild abdominal discomfort. Reports should be drinking more water to maintain hydrated. Last bowel movement this AM prior to visit; reports taking colace over the counter since discharge. He reports he continues to take his medications as prescribed and has not taken pain killers that were prescribed postoperatively. Also reports given recent weather, he has not been walking much outside.\par Denies any fevers, chills, chest pain, shortness of breath, diarrhea or vomiting. \par \par Cardiologist: Dr Sarmiento.\par Referring MD: Dr Hinojosa\par \par FxPMH: Father had history of ruptured AAA at about age 77, and mother CKD and T2DM, both now . Pt has 2 sons which pt has been advised for them to have aortic US.

## 2019-03-01 NOTE — PHYSICAL EXAM
[Normal Breath Sounds] : Normal breath sounds [Normal Heart Sounds] : normal heart sounds [1+] : left 1+ [2+] : left 2+ [No Rash or Lesion] : No rash or lesion [Alert] : alert [Oriented to Person] : oriented to person [Oriented to Place] : oriented to place [Oriented to Time] : oriented to time [Calm] : calm [Carotid Bruits] : no carotid bruits [Ankle Swelling (On Exam)] : not present [Varicose Veins Of Lower Extremities] : not present [Skin Ulcer] : no ulcer [de-identified] : AOx4, calm and cooperative, sitting in the chair [de-identified] : Mid abdominal incision, clean, dry, approximated (staples in place, removed during visit and steri-stips applied); no erythema, no discharge. Appropriately tender to palpation close to incision line, otherwise nontender.  [de-identified] : Full ROM [de-identified] : See abdominal section for incision details

## 2019-03-06 ENCOUNTER — APPOINTMENT (OUTPATIENT)
Age: 79
End: 2019-03-06
Payer: MEDICARE

## 2019-03-06 PROCEDURE — 99024 POSTOP FOLLOW-UP VISIT: CPT

## 2019-03-10 NOTE — ADDENDUM
[FreeTextEntry1] : All medical record entries made by the Scribe were at my, Dr. Jhaveri's direction and personally dictated by me on 03/06/2019 I have reviewed the chart and agree that the record accurately reflects my personal performance of the history, physical exam, assessment and plan. I have also personally directed, reviewed, and agreed with the chart.\par \par Documented by Jaden Diaz acting as a scribe for Dr. Wilfred Jhaveri on 03/06/2019

## 2019-03-10 NOTE — REASON FOR VISIT
[de-identified] : open AAA repair [de-identified] : 2/5/2019 [de-identified] : 79 y/o M, [employer] employee, presents to the office today for postoperative evaluation s/p open AAA repair on 2/5/2019 for treatment of 5.2cm AAA. He is accompanied by wife. Other PMH includes HTN, HLD, CKD Stage 3 (Cr 2.2), active daily cigar smoker (1 per day), and PSH of knee replacement and prostate surgery for BPH.\par \par Doing well, appetite improving, tolerating diet and drinking ensure protein shakes. Taking TUMS and rolaids for abdominal \par \par Denies any fevers, chills, chest pain, shortness of breath, diarrhea or vomiting.

## 2019-03-10 NOTE — PHYSICAL EXAM
[Respiratory Effort] : normal respiratory effort [1+] : left 1+ [2+] : left 2+ [No Rash or Lesion] : No rash or lesion [Alert] : alert [Calm] : calm [Normal Breath Sounds] : Normal breath sounds [Normal Heart Sounds] : normal heart sounds [Oriented to Person] : oriented to person [Oriented to Place] : oriented to place [Oriented to Time] : oriented to time [JVD] : no jugular venous distention  [Ankle Swelling (On Exam)] : not present [de-identified] : Well appearing, NAD [de-identified] : NCAT [de-identified] : Mid abdominal incision, clean, dry, slightly indurated, approximated no erythema, no discharge. Appropriately tender to palpation close to incision line, otherwise nontender.  [de-identified] : FROM

## 2019-03-10 NOTE — DISCUSSION/SUMMARY
[FreeTextEntry1] : 79 y/o M 1 month s/p open AAA repair. Much improved from last visit.  More alert and animated and close to baseline.  Doing well, appetite improving, tolerating diet and supplementing with ensure/boost. Incision is healing well, no signs of infection. Will obtain patient's records from the hospital and send to his primary care doctor. \par Patient will return in 1 year.

## 2019-05-31 ENCOUNTER — APPOINTMENT (OUTPATIENT)
Dept: FAMILY MEDICINE | Facility: CLINIC | Age: 79
End: 2019-05-31
Payer: MEDICARE

## 2019-05-31 VITALS
HEIGHT: 69 IN | WEIGHT: 170 LBS | RESPIRATION RATE: 14 BRPM | DIASTOLIC BLOOD PRESSURE: 70 MMHG | HEART RATE: 75 BPM | SYSTOLIC BLOOD PRESSURE: 125 MMHG | BODY MASS INDEX: 25.18 KG/M2

## 2019-05-31 PROCEDURE — 99214 OFFICE O/P EST MOD 30 MIN: CPT

## 2019-05-31 RX ORDER — DICLOFENAC SODIUM 10 MG/G
1 GEL TOPICAL
Qty: 1 | Refills: 0 | Status: ACTIVE | COMMUNITY

## 2019-05-31 RX ORDER — PREDNISONE 20 MG/1
20 TABLET ORAL
Qty: 24 | Refills: 0 | Status: COMPLETED | COMMUNITY
Start: 2019-01-05 | End: 2019-05-31

## 2019-05-31 RX ORDER — OXYCODONE AND ACETAMINOPHEN 5; 325 MG/1; MG/1
5-325 TABLET ORAL
Qty: 20 | Refills: 0 | Status: COMPLETED | COMMUNITY
Start: 2019-02-11 | End: 2019-05-31

## 2019-05-31 RX ORDER — DICLOFENAC SODIUM 10 MG/G
1 GEL TOPICAL
Qty: 1 | Refills: 0 | Status: DISCONTINUED | COMMUNITY
Start: 2019-01-03 | End: 2019-05-31

## 2019-05-31 NOTE — PHYSICAL EXAM
[No Acute Distress] : no acute distress [Well Nourished] : well nourished [Well Developed] : well developed [Normal Rate] : normal rate  [Regular Rhythm] : with a regular rhythm [Normal S1, S2] : normal S1 and S2 [No Carotid Bruits] : no carotid bruits [No Abdominal Bruit] : a ~M bruit was not heard ~T in the abdomen [No Varicosities] : no varicosities

## 2019-05-31 NOTE — HEALTH RISK ASSESSMENT
[] : Yes [No falls in past year] : Patient reported no falls in the past year [0] : 1) Little interest or pleasure doing things: Not at all (0) [1] : 2) Feeling down, depressed, or hopeless for several days (1) [[id number]] : 1

## 2019-05-31 NOTE — HISTORY OF PRESENT ILLNESS
[FreeTextEntry8] : S/P recent AAA open repair at [facility]\par Known also with CRF\par Had right thoraco-lumbar rash radiating to supraumbilical area x 3 days with moderate sensitivity

## 2019-05-31 NOTE — PLAN
[FreeTextEntry1] : Take medication as instructed\par Long discussion\par F/U PRN\par F/U Dr Nazario for chronic kidney disease regarding post herpetic neuropathy

## 2019-07-29 ENCOUNTER — RX RENEWAL (OUTPATIENT)
Age: 79
End: 2019-07-29

## 2019-07-29 DIAGNOSIS — B02.29 OTHER POSTHERPETIC NERVOUS SYSTEM INVOLVEMENT: ICD-10-CM

## 2019-11-14 ENCOUNTER — APPOINTMENT (OUTPATIENT)
Dept: FAMILY MEDICINE | Facility: CLINIC | Age: 79
End: 2019-11-14
Payer: MEDICARE

## 2019-11-14 VITALS
WEIGHT: 173.06 LBS | DIASTOLIC BLOOD PRESSURE: 76 MMHG | SYSTOLIC BLOOD PRESSURE: 138 MMHG | OXYGEN SATURATION: 97 % | HEIGHT: 69 IN | BODY MASS INDEX: 25.63 KG/M2 | RESPIRATION RATE: 16 BRPM | HEART RATE: 72 BPM

## 2019-11-14 DIAGNOSIS — Z86.19 PERSONAL HISTORY OF OTHER INFECTIOUS AND PARASITIC DISEASES: ICD-10-CM

## 2019-11-14 DIAGNOSIS — Z00.00 ENCOUNTER FOR GENERAL ADULT MEDICAL EXAMINATION W/OUT ABNORMAL FINDINGS: ICD-10-CM

## 2019-11-14 DIAGNOSIS — M19.90 UNSPECIFIED OSTEOARTHRITIS, UNSPECIFIED SITE: ICD-10-CM

## 2019-11-14 PROCEDURE — 36415 COLL VENOUS BLD VENIPUNCTURE: CPT

## 2019-11-14 PROCEDURE — G0008: CPT

## 2019-11-14 PROCEDURE — 93000 ELECTROCARDIOGRAM COMPLETE: CPT

## 2019-11-14 PROCEDURE — G0439: CPT

## 2019-11-14 PROCEDURE — 90662 IIV NO PRSV INCREASED AG IM: CPT

## 2019-11-14 PROCEDURE — 99215 OFFICE O/P EST HI 40 MIN: CPT | Mod: 25

## 2019-11-14 RX ORDER — GABAPENTIN 100 MG/1
100 CAPSULE ORAL 3 TIMES DAILY
Qty: 180 | Refills: 0 | Status: DISCONTINUED | COMMUNITY
Start: 2019-05-31 | End: 2019-11-14

## 2019-11-14 RX ORDER — VALACYCLOVIR 1 G/1
1 TABLET, FILM COATED ORAL 3 TIMES DAILY
Qty: 18 | Refills: 0 | Status: DISCONTINUED | COMMUNITY
Start: 2019-05-31 | End: 2019-11-14

## 2019-11-14 RX ORDER — DOCUSATE SODIUM 100 MG/1
100 CAPSULE, LIQUID FILLED ORAL 3 TIMES DAILY
Qty: 21 | Refills: 0 | Status: DISCONTINUED | COMMUNITY
Start: 2019-02-11 | End: 2019-11-14

## 2019-11-14 RX ORDER — GABAPENTIN 300 MG/1
300 CAPSULE ORAL 3 TIMES DAILY
Qty: 90 | Refills: 0 | Status: DISCONTINUED | COMMUNITY
Start: 2019-07-29 | End: 2019-11-14

## 2019-11-14 NOTE — PLAN
[FreeTextEntry1] : Nephrology f/u with dr Nazario\par Medication renewed\par Recommended to avoid NSAIDs for sacroiliac pain\par Pt recommended\par Received the flu shot\par Will contact thee patient with Blood test results

## 2019-11-14 NOTE — PHYSICAL EXAM
[No Acute Distress] : no acute distress [Well Nourished] : well nourished [Well Developed] : well developed [Well-Appearing] : well-appearing [Normal Sclera/Conjunctiva] : normal sclera/conjunctiva [PERRL] : pupils equal round and reactive to light [EOMI] : extraocular movements intact [Normal Oropharynx] : the oropharynx was normal [Normal Outer Ear/Nose] : the outer ears and nose were normal in appearance [No JVD] : no jugular venous distention [No Lymphadenopathy] : no lymphadenopathy [Supple] : supple [Thyroid Normal, No Nodules] : the thyroid was normal and there were no nodules present [No Respiratory Distress] : no respiratory distress  [No Accessory Muscle Use] : no accessory muscle use [Clear to Auscultation] : lungs were clear to auscultation bilaterally [Normal Rate] : normal rate  [Regular Rhythm] : with a regular rhythm [Normal S1, S2] : normal S1 and S2 [No Murmur] : no murmur heard [No Carotid Bruits] : no carotid bruits [No Abdominal Bruit] : a ~M bruit was not heard ~T in the abdomen [No Varicosities] : no varicosities [Pedal Pulses Present] : the pedal pulses are present [No Palpable Aorta] : no palpable aorta [No Edema] : there was no peripheral edema [No Extremity Clubbing/Cyanosis] : no extremity clubbing/cyanosis [Soft] : abdomen soft [Non Tender] : non-tender [Non-distended] : non-distended [No Masses] : no abdominal mass palpated [No HSM] : no HSM [Normal Bowel Sounds] : normal bowel sounds [Normal Posterior Cervical Nodes] : no posterior cervical lymphadenopathy [Normal Anterior Cervical Nodes] : no anterior cervical lymphadenopathy [No CVA Tenderness] : no CVA  tenderness [No Spinal Tenderness] : no spinal tenderness [No Joint Swelling] : no joint swelling [Grossly Normal Strength/Tone] : grossly normal strength/tone [No Rash] : no rash [Coordination Grossly Intact] : coordination grossly intact [No Focal Deficits] : no focal deficits [Deep Tendon Reflexes (DTR)] : deep tendon reflexes were 2+ and symmetric [Normal Gait] : normal gait [Normal Affect] : the affect was normal [Normal Insight/Judgement] : insight and judgment were intact

## 2019-11-14 NOTE — HISTORY OF PRESENT ILLNESS
[FreeTextEntry1] : Annual examination\par F/U chronic medical problems\par Flu shot [de-identified] : Has renal f/u with dr Nazario.\par reports worsening of the kidney problem\par also is seen by ortho and pain management for sacroiliac pain

## 2019-11-14 NOTE — HEALTH RISK ASSESSMENT
[Fair] : ~his/her~ current health as fair  [Yes] : Yes [1 or 2 (0 pts)] : 1 or 2 (0 points) [Never (0 pts)] : Never (0 points) [No falls in past year] : Patient reported no falls in the past year [0] : 1) Little interest or pleasure doing things: Not at all (0) [1] : 2) Feeling down, depressed, or hopeless for several days (1) [Patient reported colonoscopy was normal] : Patient reported colonoscopy was normal [Hepatitis C test declined] : Hepatitis C test declined [HIV test declined] : HIV test declined [Handling Complex Tasks] : difficulty handling complex tasks [Spatial Ability and Orientation] : difficulty with spatial ability and orientation [Reasoning] : difficulty with reasoning [None] : None [Sexually Active] : sexually active [] :  [With Family] : lives with family [Fully functional (bathing, dressing, toileting, transferring, walking, feeding)] : Fully functional (bathing, dressing, toileting, transferring, walking, feeding) [Fully functional (using the telephone, shopping, preparing meals, housekeeping, doing laundry, using] : Fully functional and needs no help or supervision to perform IADLs (using the telephone, shopping, preparing meals, housekeeping, doing laundry, using transportation, managing medications and managing finances) [With Patient/Caregiver] : With Patient/Caregiver [Name: ___] : Health Care Proxy's Name: [unfilled]  [] : No [de-identified] : no [Audit-CScore] : 2 [de-identified] : regular [XAB1Uzzry] : 1 [Language] : denies difficulty with language [Behavior] : denies difficulty with behavior [Learning/Retaining New Information] : denies difficulty learning/retaining new information [Reports changes in hearing] : Reports no changes in hearing [High Risk Behavior] : no high risk behavior [Reports changes in vision] : Reports no changes in vision [ColonoscopyDate] : 5/2914 [ColonoscopyComments] : Dr Edward

## 2019-11-15 LAB
25(OH)D3 SERPL-MCNC: 28.6 NG/ML
ALBUMIN SERPL ELPH-MCNC: 4.4 G/DL
ALP BLD-CCNC: 73 U/L
ALT SERPL-CCNC: 8 U/L
ANION GAP SERPL CALC-SCNC: 13 MMOL/L
APPEARANCE: CLEAR
AST SERPL-CCNC: 24 U/L
BACTERIA: NEGATIVE
BASOPHILS # BLD AUTO: 0.1 K/UL
BASOPHILS NFR BLD AUTO: 1.2 %
BILIRUB SERPL-MCNC: 0.4 MG/DL
BILIRUBIN URINE: NEGATIVE
BLOOD URINE: NEGATIVE
BUN SERPL-MCNC: 39 MG/DL
CALCIUM SERPL-MCNC: 8.9 MG/DL
CHLORIDE SERPL-SCNC: 109 MMOL/L
CHOLEST SERPL-MCNC: 142 MG/DL
CHOLEST/HDLC SERPL: 2.5 RATIO
CO2 SERPL-SCNC: 21 MMOL/L
COLOR: NORMAL
CREAT SERPL-MCNC: 2.87 MG/DL
EOSINOPHIL # BLD AUTO: 0.34 K/UL
EOSINOPHIL NFR BLD AUTO: 4 %
ESTIMATED AVERAGE GLUCOSE: 114 MG/DL
GLUCOSE QUALITATIVE U: NEGATIVE
GLUCOSE SERPL-MCNC: 88 MG/DL
HBA1C MFR BLD HPLC: 5.6 %
HCT VFR BLD CALC: 42 %
HDLC SERPL-MCNC: 57 MG/DL
HGB BLD-MCNC: 13.1 G/DL
HYALINE CASTS: 3 /LPF
IMM GRANULOCYTES NFR BLD AUTO: 0.4 %
IRON SATN MFR SERPL: 30 %
IRON SERPL-MCNC: 77 UG/DL
KETONES URINE: NEGATIVE
LDLC SERPL CALC-MCNC: 72 MG/DL
LEUKOCYTE ESTERASE URINE: NEGATIVE
LYMPHOCYTES # BLD AUTO: 1.38 K/UL
LYMPHOCYTES NFR BLD AUTO: 16.2 %
MAN DIFF?: NORMAL
MCHC RBC-ENTMCNC: 27.9 PG
MCHC RBC-ENTMCNC: 31.2 GM/DL
MCV RBC AUTO: 89.4 FL
MICROSCOPIC-UA: NORMAL
MONOCYTES # BLD AUTO: 0.84 K/UL
MONOCYTES NFR BLD AUTO: 9.9 %
NEUTROPHILS # BLD AUTO: 5.81 K/UL
NEUTROPHILS NFR BLD AUTO: 68.3 %
NITRITE URINE: NEGATIVE
PH URINE: 5.5
PLATELET # BLD AUTO: 239 K/UL
POTASSIUM SERPL-SCNC: 5.1 MMOL/L
PROT SERPL-MCNC: 6.9 G/DL
PROTEIN URINE: ABNORMAL
PSA FREE FLD-MCNC: 33 %
PSA FREE SERPL-MCNC: 1.46 NG/ML
PSA SERPL-MCNC: 4.36 NG/ML
RBC # BLD: 4.7 M/UL
RBC # FLD: 14.8 %
RED BLOOD CELLS URINE: 4 /HPF
SODIUM SERPL-SCNC: 143 MMOL/L
SPECIFIC GRAVITY URINE: 1.02
SQUAMOUS EPITHELIAL CELLS: 1 /HPF
TIBC SERPL-MCNC: 253 UG/DL
TRIGL SERPL-MCNC: 65 MG/DL
TSH SERPL-ACNC: 0.94 UIU/ML
UIBC SERPL-MCNC: 176 UG/DL
UROBILINOGEN URINE: NORMAL
VIT B12 SERPL-MCNC: 335 PG/ML
WBC # FLD AUTO: 8.5 K/UL
WHITE BLOOD CELLS URINE: 2 /HPF

## 2020-01-01 NOTE — H&P PST ADULT - BREASTS
Vaccine Information Statement(s) was given today. This has been reviewed, questions answered, and verbal consent given by Parent for injection(s) and administration of Influenza (Inactivated).    Patient tolerated without incident. See immunization grid for documentation.         No masses; no nipple discharge

## 2020-01-02 ENCOUNTER — RX RENEWAL (OUTPATIENT)
Age: 80
End: 2020-01-02

## 2020-01-15 ENCOUNTER — APPOINTMENT (OUTPATIENT)
Dept: VASCULAR SURGERY | Facility: CLINIC | Age: 80
End: 2020-01-15
Payer: MEDICARE

## 2020-01-15 VITALS — HEART RATE: 76 BPM | DIASTOLIC BLOOD PRESSURE: 78 MMHG | SYSTOLIC BLOOD PRESSURE: 136 MMHG

## 2020-01-15 PROCEDURE — 93978 VASCULAR STUDY: CPT

## 2020-01-15 PROCEDURE — 99213 OFFICE O/P EST LOW 20 MIN: CPT

## 2020-01-25 NOTE — PHYSICAL EXAM
[Normal Heart Sounds] : normal heart sounds [Respiratory Effort] : normal respiratory effort [No Rash or Lesion] : No rash or lesion [Alert] : alert [Oriented to Person] : oriented to person [Oriented to Place] : oriented to place [Oriented to Time] : oriented to time [Calm] : calm [Normal Rate and Rhythm] : normal rate and rhythm [2+] : left 2+ [JVD] : no jugular venous distention  [Carotid Bruits] : no carotid bruits [Varicose Veins Of Lower Extremities] : not present [Ankle Swelling (On Exam)] : not present [] : not present [Abdomen Masses] : No abdominal masses [Abdomen Tenderness] : ~T ~M No abdominal tenderness [Abdominal Bruit] : no abdominal bruit  [de-identified] : Well appearing, NAD [de-identified] : NCAT [FreeTextEntry1] : No LE edema. Skin intact.  [de-identified] : Soft, Non-tender, non-distended. Hyperactive bowel sounds [de-identified] : FROM

## 2020-01-25 NOTE — CONSULT LETTER
[Dear  ___] : Dear  [unfilled], [FreeTextEntry2] : Lilia Roper M.D.\par [address], [address]\[place] [address]\par \par Kade Sarmiento M.D.\par [address]\[place] [address]\par \par Sunny Nazario M.D.\par [address]\[place] [address] \par \par Manuel Hinojosa M.D.\par [address]\[place] [address]  [FreeTextEntry1] : I saw Mr Carrington Arias for one year followup appointment after open AAA repair on February 5, 2019. He is accompanied by his wife.  They will be traveling to [place] soon. He reports feeling well. Denies any fevers, chills, shortness of breath, unusual abdominal or back pain. Recent blood work showed some deterioration in the  creatinine level.  He is following with Nephrology.   \par \par On exam, his midline abdominal incision is healed. Abdomen is soft, nontender with hyperactive bowel sounds. His extremities are well perfused, no edema appreciated. Blood pressure 136/78 and heart rate 76 b/min. \par \par I am very pleased with Mr Arias's progress after open AAA almost one year ago. I have encouraged him to stay active. I will see him again in 1 year.\par \par My complete EMR office note is below for your records.  [FreeTextEntry3] : Sincerely, \par \par Wilfred Jhaveri M.D. \par , Surgical Services [facility]\par , Department of Surgery [facility]\par Professor of Surgery, [employer] at [facility]

## 2020-01-25 NOTE — HISTORY OF PRESENT ILLNESS
[FreeTextEntry1] : 78 y/o M, current cigar smoker, [email or web address] employee, with Hx of HLD, HTN, CKD Stage 3 (Cr 2.7, previously 2.2), and PSHx of knee replacement and prostate surgery for BPH, now s/p open AAA repair on 2/5/2019 for treatment of 5.2cm AAA. He reports here feeling well. He denies any changes to his medications. He does admit to a recent episode of Shingles outbreak with some residual pain. Reports of normal appetite and tolerating diet. He will be following up with his nephrologist, , in March; most recent blood work demonstrated his creatinine (2.7). Otherwise, he denies any chest pain, abdominal pain, or SOB. He does complain of chronic back pain, worse with walking. He has been seeing a neurologist for this and has been receiving pain injections to the area with no relief. He would like a referral to a doctor to address this.\par \par He is accompanied here today by his wife. They plan to travel to [place] this Friday.

## 2020-01-25 NOTE — ADDENDUM
[FreeTextEntry1] : Documented by Connor Connor acting as a scribe for Dr. Wilfred Jhaveri on 01/15/2020\par

## 2020-01-25 NOTE — ASSESSMENT
[Arterial/Venous Disease] : arterial/venous disease [FreeTextEntry1] : 80 y/o M, current cigar smoker, [employer] employee, now s/p open AAA repair on 2/5/2019 for treatment of 5.2cm AAA. Patient is doing well. He is stable and asymptomatic. Abdominal US completed today shows no signs of endoleak. The graft is patent. He is advised to perform calf exercises and take frequent deep breaths during the plane flight to [place]. Regarding his renal function, he is advised to follow through with his appointment with the  nephrologist. To follow up here in 1 year.

## 2020-01-25 NOTE — END OF VISIT
[FreeTextEntry3] : All medical record entries made by the Scribe were at my, Dr. Jhaveri's direction and personally dictated by me on 01/15/2020 I have reviewed the chart and agree that the record accurately reflects my personal performance of the history, physical exam, assessment and plan. I have also personally directed, reviewed, and agreed with the chart.\par

## 2020-03-02 NOTE — PHYSICAL EXAM
"SUBJECTIVE:   Lindsay Yuen is a 66 year old female who presents for Preventive Visit.      Are you in the first 12 months of your Medicare coverage?  No    Healthy Habits:     In general, how would you rate your overall health?  Good    Frequency of exercise:  2-3 days/week    Duration of exercise:  15-30 minutes    Do you usually eat at least 4 servings of fruit and vegetables a day, include whole grains    & fiber and avoid regularly eating high fat or \"junk\" foods?  Yes    Taking medications regularly:  Yes    Medication side effects:  None, No muscle aches and No significant flushing    Ability to successfully perform activities of daily living:  No assistance needed    Home Safety:  No safety concerns identified    Hearing Impairment:  No hearing concerns    In the past 6 months, have you been bothered by leaking of urine?  No    In general, how would you rate your overall mental or emotional health?  Good      PHQ-2 Total Score: 0    Additional concerns today:  No    Do you feel safe in your environment? Yes    Have you ever done Advance Care Planning? (For example, a Health Directive, POLST, or a discussion with a medical provider or your loved ones about your wishes): No, advance care planning information given to patient to review.  Patient plans to discuss their wishes with loved ones or provider.        Fall risk  Fallen 2 or more times in the past year?: No  Any fall with injury in the past year?: No      Do you have sleep apnea, excessive snoring or daytime drowsiness?: no    Reviewed and updated as needed this visit by clinical staff  Tobacco  Allergies  Meds  Problems  Med Hx  Surg Hx  Fam Hx  Soc Hx          Reviewed and updated as needed this visit by Provider  Tobacco  Allergies  Meds  Problems  Med Hx  Surg Hx  Fam Hx        Social History     Tobacco Use     Smoking status: Never Smoker     Smokeless tobacco: Never Used     Tobacco comment: no smokers in the [facility]   Substance " Use Topics     Alcohol use: Yes     Comment: occasional         Alcohol Use 3/10/2020   Prescreen: >3 drinks/day or >7 drinks/week? No   Prescreen: >3 drinks/day or >7 drinks/week? -               Current providers sharing in care for this patient include:   Patient Care Team:  Colleen Marroquin MD as PCP - General (Family Practice)  Colleen Marroquin MD as Assigned PCP    The following health maintenance items are reviewed in Epic and correct as of today:  Health Maintenance   Topic Date Due     ZOSTER IMMUNIZATION (1 of 2) 05/15/2004     MEDICARE ANNUAL WELLNESS VISIT  05/15/2019     PNEUMOCOCCAL IMMUNIZATION 65+ LOW/MEDIUM RISK (1 of 2 - PCV13) 05/15/2019     BMP  2020     LIPID  2020     COLORECTAL CANCER SCREENING  2020     FALL RISK ASSESSMENT  2020     MAMMO SCREENING  2021     ADVANCE CARE PLANNING  2022     DTAP/TDAP/TD IMMUNIZATION (4 - Td) 2029     DEXA  Completed     HEPATITIS C SCREENING  Completed     HIV SCREENING  Completed     PHQ-2  Completed     INFLUENZA VACCINE  Completed     IPV IMMUNIZATION  Aged Out     MENINGITIS IMMUNIZATION  Aged Out     G 2 P 2   No LMP recorded. Patient is postmenopausal.     Fasting: No   Td: tdap        Last 3 Pap and HPV Results:   PAP / HPV 2014   PAP NIL NIL                  Cholesterol:   Lab Results   Component Value Date    CHOL 163 2019     Lab Results   Component Value Date    HDL 63 2019     Lab Results   Component Value Date    LDL 79 2019     Lab Results   Component Value Date    TRIG 107 2019     Lab Results   Component Value Date    CHOLHDLRATIO 2.5 2015         MM/19  Dexa:       Flex/colo: 8/10 due this year      Seat Belt: Yes    Sunscreen use: Yes   Calcium Intake: adeq  Health Care Directive: Yes   Sexually Active: No    Current contraception: none  History of abnormal Pap smear: Yes: see pmh  Family history of colon/breast/ovarian cancer: Yes:  see [facility]  Regular self breast exam: Yes  History of abnormal mammogram: No      Labs reviewed in EPIC  BP Readings from Last 3 Encounters:   03/10/20 126/70   02/05/20 (!) 146/66   01/29/20 (!) 148/78    Wt Readings from Last 3 Encounters:   03/10/20 97.5 kg (215 lb)   02/05/20 98.7 kg (217 lb 9.6 oz)   01/15/20 98 kg (216 lb)                  Patient Active Problem List   Diagnosis     Hyperlipidemia with target LDL less than 130     Cervical polyp     Hypertension goal BP (blood pressure) < 140/90     Advanced directives, counseling/discussion     Morbid obesity due to excess calories (H)     Past Surgical History:   Procedure Laterality Date     CHOLECYSTECTOMY, LAPOROSCOPIC              Social History     Tobacco Use     Smoking status: Never Smoker     Smokeless tobacco: Never Used     Tobacco comment: no smokers in the [facility]   Substance Use Topics     Alcohol use: Yes     Comment: occasional     Family History   Problem Relation Age of Onset     Breast Cancer Mother      Hypertension Mother      Lipids Mother      Cancer Father         kidney cancer     Diabetes Paternal Grandfather         adult     Breast Cancer Sister      Cancer Sister      Heart Disease Maternal Aunt      Cancer Sister         pancreatic         Current Outpatient Medications   Medication Sig Dispense Refill     aspirin 81 MG tablet Take 1 tablet by mouth daily.       ATORVASTATIN 10 MG PO tablet Take 1 tablet (10 mg) by mouth daily 90 tablet 1     CALCIUM 600 + D 600-200 MG-UNIT OR TABS 2 pill a day  3     CENTRUM SILVER OR TABS ONE DAILY 0 0     hydrochlorothiazide (HYDRODIURIL) 25 MG tablet Take 1 tablet (25 mg) by mouth daily 90 tablet 0     latanoprost (XALATAN) 0.005 % ophthalmic solution Place 1 drop Into the left eye At Bedtime       losartan (COZAAR) 100 MG tablet Take 1 tablet (100 mg) by mouth daily 90 tablet 0     VITAMIN E 400 UNIT OR CAPS ONE CAPSULE DAILY       Pneumonia Vaccine:due  Mammogram Screening: Mammogram  "Screening: Patient over age 50, mutual decision to screen reflected in health maintenance.  Shingrix: recommended  Flu: 10/19    Review of Systems   Constitutional: Negative for chills and fever.   HENT: Negative for congestion, ear pain, hearing loss and sore throat.    Eyes: Positive for visual disturbance. Negative for pain.   Respiratory: Negative for cough and shortness of breath.    Cardiovascular: Negative for chest pain, palpitations and peripheral edema.   Gastrointestinal: Negative for abdominal pain, constipation, diarrhea, heartburn, hematochezia and nausea.   Breasts:  Negative for tenderness, breast mass and discharge.   Genitourinary: Negative for dysuria, frequency, genital sores, hematuria, pelvic pain, urgency, vaginal bleeding and vaginal discharge.   Musculoskeletal: Positive for arthralgias and joint swelling. Negative for myalgias.   Skin: Negative for rash.   Neurological: Negative for dizziness, weakness, headaches and paresthesias.   Psychiatric/Behavioral: Negative for mood changes. The patient is not nervous/anxious.          OBJECTIVE:   /70   Pulse 75   Temp 98.1  F (36.7  C) (Oral)   Resp 20   Wt 97.5 kg (215 lb)   BMI 36.90 kg/m   Estimated body mass index is 36.9 kg/m  as calculated from the following:    Height as of 1/15/20: 1.626 m (5' 4\").    Weight as of this encounter: 97.5 kg (215 lb).  Physical Exam  GENERAL APPEARANCE: healthy, alert and no distress  EYES: Eyes grossly normal to inspection, PERRL and conjunctivae and sclerae normal  HENT: ear canals and TM's normal, nose and mouth without ulcers or lesions, oropharynx clear and oral mucous membranes moist  NECK: no adenopathy, no asymmetry, masses, or scars and thyroid normal to palpation  RESP: lungs clear to auscultation - no rales, rhonchi or wheezes  BREAST: normal without masses, tenderness or nipple discharge and no palpable axillary masses or adenopathy  CV: regular rate and rhythm, normal S1 S2, no S3 or " "S4, no murmur, click or rub, no peripheral edema and peripheral pulses strong  ABDOMEN: soft, nontender, no hepatosplenomegaly, no masses and bowel sounds normal  MS: no musculoskeletal defects are noted and gait is age appropriate without ataxia  SKIN: no suspicious lesions or rashes  NEURO: Normal strength and tone, sensory exam grossly normal, mentation intact and speech normal  PSYCH: mentation appears normal and affect normal/bright    Diagnostic Test Results:  Labs reviewed in Epic    ASSESSMENT / PLAN:   (Z00.00) Encounter for Medicare annual wellness exam  (primary encounter diagnosis)  Comment: preventive needs reviewed   Plan: see orders in Epic.     (I10) Hypertension goal BP (blood pressure) < 140/90  Comment: to goal  Plan: hydrochlorothiazide (HYDRODIURIL) 25 MG tablet,        losartan (COZAAR) 100 MG tablet, Basic         metabolic panel  (Ca, Cl, CO2, Creat, Gluc, K,         Na, BUN)        refills until August 2020, when due for pharm bp check and labs    (E78.5) Hyperlipidemia with target LDL less than 130  Comment: due  Plan: Lipid panel reflex to direct LDL Fasting              COUNSELING:  Reviewed preventive health counseling, as reflected in patient instructions  Special attention given to:       Regular exercise       Healthy diet/nutrition    Estimated body mass index is 36.9 kg/m  as calculated from the following:    Height as of 1/15/20: 1.626 m (5' 4\").    Weight as of this encounter: 97.5 kg (215 lb).    Weight management plan: Discussed healthy diet and exercise guidelines     reports that she has never smoked. She has never used smokeless tobacco.      Appropriate preventive services were discussed with this patient, including applicable screening as appropriate for cardiovascular disease, diabetes, osteopenia/osteoporosis, and glaucoma.  As appropriate for age/gender, discussed screening for colorectal cancer, prostate cancer, breast cancer, and cervical cancer. Checklist reviewing " preventive services available has been given to the patient.    Reviewed patients plan of care and provided an AVS. The Basic Care Plan (routine screening as documented in Health Maintenance) for Lindsay meets the Care Plan requirement. This Care Plan has been established and reviewed with the Patient.    Counseling Resources:  ATP IV Guidelines  Pooled Cohorts Equation Calculator  Breast Cancer Risk Calculator  FRAX Risk Assessment  ICSI Preventive Guidelines  Dietary Guidelines for Americans, 2010  USDA's MyPlate  ASA Prophylaxis  Lung CA Screening    Colleen Marroquin MD  [facility]    Identified Health Risks:   [JVD] : no jugular venous distention  [Normal Breath Sounds] : Normal breath sounds [Normal Rate and Rhythm] : normal rate and rhythm [2+] : left 2+ [Ankle Swelling (On Exam)] : not present [Ankle Swelling Bilaterally] : bilaterally  [Varicose Veins Of Lower Extremities] : bilaterally [] : bilaterally [No Rash or Lesion] : No rash or lesion [Alert] : alert [Oriented to Person] : oriented to person [Oriented to Place] : oriented to place [Oriented to Time] : oriented to time [de-identified] : Awake and Alert [de-identified] : pulsatile abdominal mass [de-identified] : appropriate

## 2020-09-24 ENCOUNTER — APPOINTMENT (OUTPATIENT)
Dept: FAMILY MEDICINE | Facility: CLINIC | Age: 80
End: 2020-09-24
Payer: MEDICARE

## 2020-09-24 VITALS — DIASTOLIC BLOOD PRESSURE: 76 MMHG | SYSTOLIC BLOOD PRESSURE: 138 MMHG

## 2020-09-24 DIAGNOSIS — Z23 ENCOUNTER FOR IMMUNIZATION: ICD-10-CM

## 2020-09-24 DIAGNOSIS — E78.5 HYPERLIPIDEMIA, UNSPECIFIED: ICD-10-CM

## 2020-09-24 PROCEDURE — 99213 OFFICE O/P EST LOW 20 MIN: CPT | Mod: 25

## 2020-09-24 PROCEDURE — 90662 IIV NO PRSV INCREASED AG IM: CPT

## 2020-09-24 PROCEDURE — G0008: CPT

## 2020-09-24 PROCEDURE — 36415 COLL VENOUS BLD VENIPUNCTURE: CPT

## 2020-09-24 NOTE — HISTORY OF PRESENT ILLNESS
[FreeTextEntry1] : CRF\par HTN\par Need of flu [de-identified] : Here for blood work and Flu shot\par Renal care with dr Nazario\par BP well mabnaged

## 2020-09-24 NOTE — HEALTH RISK ASSESSMENT
[No falls in past year] : Patient reported no falls in the past year [1] : 1) Little interest or pleasure doing things for several days (1) [0] : 2) Feeling down, depressed, or hopeless: Not at all (0) [SEH2Buybg] : 1

## 2020-09-24 NOTE — PLAN
[FreeTextEntry1] : Overall in stable condition\par Will forward blood test results to dr Nazario\par Received the flu shot left deltoid

## 2020-09-25 LAB
ALBUMIN SERPL ELPH-MCNC: 4.7 G/DL
ALP BLD-CCNC: 72 U/L
ALT SERPL-CCNC: 19 U/L
ANION GAP SERPL CALC-SCNC: 16 MMOL/L
AST SERPL-CCNC: 30 U/L
BASOPHILS # BLD AUTO: 0.12 K/UL
BASOPHILS NFR BLD AUTO: 1.4 %
BILIRUB SERPL-MCNC: 0.4 MG/DL
BUN SERPL-MCNC: 46 MG/DL
CALCIUM SERPL-MCNC: 9.1 MG/DL
CHLORIDE SERPL-SCNC: 107 MMOL/L
CHOLEST SERPL-MCNC: 127 MG/DL
CHOLEST/HDLC SERPL: 2.1 RATIO
CO2 SERPL-SCNC: 19 MMOL/L
CREAT SERPL-MCNC: 3.36 MG/DL
EOSINOPHIL # BLD AUTO: 0.35 K/UL
EOSINOPHIL NFR BLD AUTO: 4.2 %
GLUCOSE SERPL-MCNC: 106 MG/DL
HCT VFR BLD CALC: 38.9 %
HDLC SERPL-MCNC: 60 MG/DL
HGB BLD-MCNC: 12.2 G/DL
IMM GRANULOCYTES NFR BLD AUTO: 0.4 %
LDLC SERPL CALC-MCNC: 56 MG/DL
LYMPHOCYTES # BLD AUTO: 1.29 K/UL
LYMPHOCYTES NFR BLD AUTO: 15.5 %
MAN DIFF?: NORMAL
MCHC RBC-ENTMCNC: 28.6 PG
MCHC RBC-ENTMCNC: 31.4 GM/DL
MCV RBC AUTO: 91.3 FL
MONOCYTES # BLD AUTO: 0.74 K/UL
MONOCYTES NFR BLD AUTO: 8.9 %
NEUTROPHILS # BLD AUTO: 5.8 K/UL
NEUTROPHILS NFR BLD AUTO: 69.6 %
PLATELET # BLD AUTO: 233 K/UL
POTASSIUM SERPL-SCNC: 5.3 MMOL/L
PROT SERPL-MCNC: 6.8 G/DL
RBC # BLD: 4.26 M/UL
RBC # FLD: 14.4 %
SODIUM SERPL-SCNC: 142 MMOL/L
TRIGL SERPL-MCNC: 54 MG/DL
WBC # FLD AUTO: 8.33 K/UL

## 2021-01-13 ENCOUNTER — APPOINTMENT (OUTPATIENT)
Dept: VASCULAR SURGERY | Facility: CLINIC | Age: 81
End: 2021-01-13
Payer: MEDICARE

## 2021-01-13 VITALS — DIASTOLIC BLOOD PRESSURE: 76 MMHG | SYSTOLIC BLOOD PRESSURE: 117 MMHG | HEART RATE: 93 BPM

## 2021-01-13 PROCEDURE — 99214 OFFICE O/P EST MOD 30 MIN: CPT

## 2021-01-13 PROCEDURE — 93978 VASCULAR STUDY: CPT

## 2021-01-15 ENCOUNTER — TRANSCRIPTION ENCOUNTER (OUTPATIENT)
Age: 81
End: 2021-01-15

## 2021-01-16 NOTE — ADDENDUM
[FreeTextEntry1] : This note was written by Ophelia Guerrier on 01/13/2021 acting as scribe for Wilfred Martel M.D.\par \par I, Wilfred Le  have read and attest that all the information, medical decision making and discharge instructions within are true and accurate.

## 2021-01-16 NOTE — CONSULT LETTER
[Dear  ___] : Dear  [unfilled], [FreeTextEntry2] : Lilia Roper M.D.\par [address]\[place] [address]\par \par Kade Sarmiento M.D.\par [address]\[place] [address]\par \par Sunny Nazario M.D.\par [address]\[place] [address] \par \par Manuel Hinojosa M.D.\par [address]\[place] [address] [FreeTextEntry1] : I saw Mr Carrington Arias for followup. I performed an open abdominal aortic aneurysm repair on February 5th, 2019.  He is doing well but reports new epigastric abdominal wall pain.  He also complains of nausea, decrease in appetite and some weight loss. Denies any fevers, chills, shortness of breath or unusual back pain. He also adds, his renal functions has deteriorated some more. 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 The abdomen is otherwise soft, non-tender and non-distended.  Pulses are palpable in the legs and feet.  Blood pressure 117/76 and heart rate 93 b/min. \par \par Abdominal ultrasound demonstrated normal aorta with patent graft. \par \par Given findings of physical exam and his gastrointestinal symptoms, I ordered a CT scan.  I have encouraged him to stay active. We will contact him as soon as results are obtained to determine next steps to follow. \par \par My complete EMR office note is below for your records [FreeTextEntry3] : Sincerely, \par \par Wilfred Jhaveri M.D. \par , Surgical Services [facility]\par , Department of Surgery [facility]\par Professor of Surgery, [employer] at [facility]

## 2021-01-16 NOTE — PHYSICAL EXAM
[Respiratory Effort] : normal respiratory effort [Normal Rate and Rhythm] : normal rate and rhythm [2+] : right 2+ [No Rash or Lesion] : No rash or lesion [Alert] : alert [Oriented to Person] : oriented to person [Oriented to Place] : oriented to place [Oriented to Time] : oriented to time [Calm] : calm [Abdomen Masses] : Abdomen masses present [JVD] : no jugular venous distention  [Carotid Bruits] : no carotid bruits [Ankle Swelling (On Exam)] : not present [Varicose Veins Of Lower Extremities] : not present [] : not present [Abdomen Tenderness] : ~T ~M No abdominal tenderness [Abdominal Bruit] : no abdominal bruit  [de-identified] : NCAT [de-identified] : Well appearing, NAD [FreeTextEntry1] : No LE edema. Skin intact.  [de-identified] : abdominal wall incision ossification from below the mid xiphoid down to the lateral - upper umbilicus; remainder of the abdomen is Soft, Non-tender, non-distended.  [de-identified] : FROM 5/5 x 4.

## 2021-01-16 NOTE — ASSESSMENT
[FreeTextEntry1] : 79 y/o M, with PMH CKD Stage 3-4, and PSHx of AAA of 5.2 cm s/p open AAA repair on 2/5/2019, now with abdominal incisional ossification with associated GI symptoms and weight loss. On exam, palpable cord-like  ossification from below the mid xiphoid down to the lateral - upper umbilicus, remainder of the abdomen is Soft, Non-tender, non-distended. Abdominal US with patent abdominal aortic graft with no AAA noted .  Discussed findings and patient to complete non contrast CT of the abdomen to further evaluate his symptoms.  We will schedule this for him in [place] which is closer to his house.

## 2021-01-16 NOTE — HISTORY OF PRESENT ILLNESS
[FreeTextEntry1] : 81 y/o M, current cigar smoker, [employer] employee, with Hx of HLD, HTN, CKD Stage 3-4 (Cr per pt n the 3 range), and PSHx of knee replacement and prostate surgery for BPH, AAA of 5.2 cm s/p open AAA repair on 2/5/2019. He reports here feeling well overall, except he has been experiencing a "bubble" sensation to the epigastric region when bending over. He felt a small "pop" with some associated discomfort ~ 1-2 months ago. He states he has been feeling nauseous and therefore has decrease his PO intake and he noticed loosing some weight. Denies any changes to his medications, chest pain, or SOB. He does complain of chronic back pain, worse with walking. He has been trying to apply to get the COVID-19 vaccine. \par

## 2021-01-16 NOTE — PROCEDURE
[FreeTextEntry1] : Abdominal US: S/p open repair AAA. Patent abdominal aortic graft with no evidence of abdominal aortic aneurysm noted.

## 2021-02-03 ENCOUNTER — RESULT REVIEW (OUTPATIENT)
Age: 81
End: 2021-02-03

## 2021-03-15 ENCOUNTER — APPOINTMENT (OUTPATIENT)
Dept: FAMILY MEDICINE | Facility: CLINIC | Age: 81
End: 2021-03-15

## 2021-03-15 ENCOUNTER — APPOINTMENT (OUTPATIENT)
Dept: SURGERY | Facility: CLINIC | Age: 81
End: 2021-03-15
Payer: MEDICARE

## 2021-03-15 VITALS
DIASTOLIC BLOOD PRESSURE: 87 MMHG | HEART RATE: 86 BPM | SYSTOLIC BLOOD PRESSURE: 156 MMHG | TEMPERATURE: 97.7 F | WEIGHT: 168 LBS | OXYGEN SATURATION: 98 % | BODY MASS INDEX: 24.88 KG/M2 | HEIGHT: 69 IN

## 2021-03-15 DIAGNOSIS — K42.9 UMBILICAL HERNIA W/OUT OBSTRUCTION OR GANGRENE: ICD-10-CM

## 2021-03-15 DIAGNOSIS — I71.4 ABDOMINAL AORTIC ANEURYSM, W/OUT RUPTURE: ICD-10-CM

## 2021-03-15 DIAGNOSIS — L76.82 OTHER POSTPROCEDURAL COMPLICATIONS OF SKIN AND SUBCUTANEOUS TISSUE: ICD-10-CM

## 2021-03-15 PROCEDURE — 99214 OFFICE O/P EST MOD 30 MIN: CPT

## 2021-03-16 NOTE — PHYSICAL EXAM
[Normal Breath Sounds] : Normal breath sounds [Normal Heart Sounds] : normal heart sounds [Normal Rate and Rhythm] : normal rate and rhythm [Alert] : alert [Oriented to Person] : oriented to person [Oriented to Place] : oriented to place [Oriented to Time] : oriented to time [Calm] : calm [Abdomen Tenderness] : ~T ~M No abdominal tenderness [Tender] : was nontender [Enlarged] : not enlarged [de-identified] : NAD, comfortable [de-identified] : Normocephalic, atraumatic. No scleral icterus.  [de-identified] : Supple, no JVD or cervical lymphadenopathy.  [de-identified] : No respiratory distress.  [de-identified] : +BS soft, nontender, nondistended. Abdominal mass overlying abdominal incision. There is an umbilical hernia, nontender and reducible.  [de-identified] : Warm, dry.

## 2021-03-16 NOTE — DATA REVIEWED
[FreeTextEntry1] : CT ABD/PELVIS (2/3/2021): Reviewed. Stable 1cm nodule in the right lower lobe on lung. Several hypodense lesions in the right hepatic lobe. 8mm hyperdense exophytic nodule arising from the lower pole of the left kidney. Enlarged prostate.

## 2021-03-16 NOTE — HISTORY OF PRESENT ILLNESS
[de-identified] : This is a 81y/o male with a pmhx of HLD, HTN, CKD and s/p open AAA repair in 2019. Referred to the office by . He presents to the office for evaluation and management of abdominal incisional ossification. He reports he feels a "popping" sensation over his incision when bending over or twisting from side to side. He first experienced this discomfort 3-4 months ago. He reports he feels an uneven surface underneath his incision, does not feel a bulge. He reports he has a decreased appetite in the past few months with associated weight loss. Recently had COVID and has now fully recovered. Denies fever, nausea, vomiting, diarrhea or constipation.

## 2021-03-16 NOTE — ASSESSMENT
[FreeTextEntry1] : Case discussed with attending, . This is a pleasant 79 y/o male with a pmhx of HLD, HTN, CKD and s/p open AAA repair in 2019. He has a symptomatic abdominal wall mass and umbilical hernia. Discussed option of surgical repair involving open excision of abdominal wall mass, umbilical hernia repair with possible mesh, and possible myofascial advancement flap. Patient expressed understanding and wishes to pursue surgery. Discussed and reviewed patients recent CT scan in detail and discussed referral to cardiothoracic surgery for evaluation of lung nodule. He should be evaluated by cardiothoracic prior to scheduling surgery. He should also receive his COVID vaccination prior to surgery.  All questions were answered. Notably greater than 50% of today's visit was spent on counseling and coordination of his care.

## 2021-03-17 ENCOUNTER — APPOINTMENT (OUTPATIENT)
Dept: FAMILY MEDICINE | Facility: CLINIC | Age: 81
End: 2021-03-17
Payer: MEDICARE

## 2021-03-17 VITALS
BODY MASS INDEX: 24.88 KG/M2 | OXYGEN SATURATION: 96 % | WEIGHT: 168 LBS | HEIGHT: 69 IN | DIASTOLIC BLOOD PRESSURE: 72 MMHG | SYSTOLIC BLOOD PRESSURE: 118 MMHG | TEMPERATURE: 98.5 F | HEART RATE: 71 BPM

## 2021-03-17 DIAGNOSIS — L90.5 SCAR CONDITIONS AND FIBROSIS OF SKIN: ICD-10-CM

## 2021-03-17 DIAGNOSIS — K76.9 LIVER DISEASE, UNSPECIFIED: ICD-10-CM

## 2021-03-17 DIAGNOSIS — R91.1 SOLITARY PULMONARY NODULE: ICD-10-CM

## 2021-03-17 PROCEDURE — 99214 OFFICE O/P EST MOD 30 MIN: CPT

## 2021-03-17 NOTE — PLAN
[FreeTextEntry1] : Reassurance for pulmonary nodule\par Get MRI abdomen for liver density\par F/U renal with Dr Nazario\par Return in 4 mo or as needed

## 2021-03-17 NOTE — HISTORY OF PRESENT ILLNESS
[FreeTextEntry1] : Discuss health issues\par Abnormal Ct findings\par Uncomfortablemidline abdominal scar [de-identified] : CT abdomen revealed,densities in the right liver and a stable pulmonary nodule\par The general surgeon at [place] wants revision of the midline scar and  repair a small abd hernia\par The pulmonary nodule is stable, no need of Thoracic surgery evaluation and f/u by CT is recommended every year

## 2021-06-21 NOTE — PATIENT PROFILE ADULT - BRADEN ACTIVITY
Anesthesia Evaluation      Patient summary reviewed     Airway   Mallampati: II  Neck ROM: full   Pulmonary     breath sounds clear to auscultation  (+) asthma  mild,  well controlled, sleep apnea (Patient claims it is central not obstructive sleep apnea.  No CPAP offered ) on no CPAP, ,   (-) pneumonia, COPD, shortness of breath, recent URI, not a smoker                         Cardiovascular   Exercise tolerance: > or = 4 METS  (+) , hypercholesterolemia,     (-) angina  ECG reviewed  Rhythm: regular  Rate: normal,         Neuro/Psych    (+) depression,     Endo/Other    (-) no diabetes     GI/Hepatic/Renal            Dental    (+) caps                       Anesthesia Plan  Planned anesthetic: spinal  Magnesium gtt & ketamine 50mg intraop to reduce opioids (central sleep apnea)    SaO2 monitoring postop overnight   ASA 2     Anesthetic plan and risks discussed with: patient  Anesthesia plan special considerations: antiemetics,   Post-op plan: routine recovery          
(4) walks frequently

## 2022-07-12 ENCOUNTER — RESULT REVIEW (OUTPATIENT)
Age: 82
End: 2022-07-12

## 2022-07-12 ENCOUNTER — APPOINTMENT (OUTPATIENT)
Dept: PAIN MANAGEMENT | Facility: CLINIC | Age: 82
End: 2022-07-12

## 2022-07-12 VITALS
BODY MASS INDEX: 23.7 KG/M2 | SYSTOLIC BLOOD PRESSURE: 128 MMHG | RESPIRATION RATE: 16 BRPM | WEIGHT: 160 LBS | HEIGHT: 69 IN | DIASTOLIC BLOOD PRESSURE: 64 MMHG

## 2022-07-12 DIAGNOSIS — M53.3 SACROCOCCYGEAL DISORDERS, NOT ELSEWHERE CLASSIFIED: ICD-10-CM

## 2022-07-12 DIAGNOSIS — M48.061 SPINAL STENOSIS, LUMBAR REGION WITHOUT NEUROGENIC CLAUDICATION: ICD-10-CM

## 2022-07-12 PROCEDURE — 99214 OFFICE O/P EST MOD 30 MIN: CPT

## 2022-07-12 RX ORDER — SODIUM BICARBONATE 650 MG/1
650 TABLET ORAL
Qty: 180 | Refills: 0 | Status: DISCONTINUED | COMMUNITY
Start: 2021-12-09

## 2022-07-12 NOTE — DATA REVIEWED
[FreeTextEntry1] : ******ADDENDUM****** \par Addendum to report MRI study of the lumbar spine performed 1/15/2019 \par Addendum was dictated 1/17/2019. \par Addendum: Findings regarding abdominal aortic aneurysm discussed over the \par phone with medical assistant, Matilda Cormier. \par Electronically Signed: \par ______________________________________________ \par Gorge Fields MD \par 01/17/19 0947 \par PROCEDURE: MRI study of the lumbar spine without contrast, 1/15/2019 \par TECHNIQUE: 1.5 Lien magnet. Localizer images. Coronal T2-weighted. Sagittal \par T1-weighted, T2-weighted and STIR. Axial T1-weighted and T2-weighted. \par CONTRAST: None \par COMPARISON: None \par CLINICAL INFORMATION: M54.16, acute left lumbar radiculopathy. \par FINDINGS AT MULTIPLE LEVELS: There is multilevel disc degeneration with loss \par of normal high signal in intervertebral discs on the sagittal T2-weighted \par sequence. There are anterior vertebral body osteophytes, spondylosis \par deformans. \par FINDINGS AT SPECIFIC LEVELS: \par Sacroiliac joints: Visualized portions of the sacroiliac joints \par demonstrate anterior osteophytes, right greater than left. \par L5-S1: Marked facet osteoarthritis. Mild disc bulge. Mild left foramen \par narrowing. \par L4-L5: Marked facet osteoarthritis. Thickening of the ligamentum flavum. \par Minimal degenerative anterolisthesis. Moderate central spinal stenosis. \par Moderate right subarticular zone stenosis. Mild left subarticular zone \par stenosis. Mild foramen stenosis. \par L3-L4: Moderate facet osteoarthritis. Thickening of the ligamentum flavum. \par Mild disc thinning. Mild disc bulging. Moderate central spinal stenosis. Mild \par bilateral subarticular zone stenosis. Mild foramen stenosis. Moderate facet \par osteoarthritis. Thickening of the ligamentum flavum. Disc thinning and disc \par bulging. Mild degenerative retrolisthesis. Mild central spinal stenosis. Mild \par subarticular zone stenosis. Moderate right foramen stenosis. Mild left foramen \par stenosis. \par L2-L3: Moderate facet osteoarthritis. Thickening of the ligamentum flavum. \par Disc thinning and disc bulging. Mild degenerative retrolisthesis. Mild central \par spinal stenosis. Mild subarticular zone stenosis. Moderate right foramen \par stenosis. Mild left foramen stenosis. \par L1-L2: Mild facet osteoarthritis. Disc thinning and disc bulging. Mild \par degenerative retrolisthesis. \par T12-L1: No bulge or herniation. \par INTRADURAL SPACE AND CONUS MEDULLARIS: No lesion demonstrated. No tethering of \par the spinal cord. \par BONE MARROW SIGNAL: Subchondral degenerative changes at multiple levels. \par Chronic Schmorl's nodes at multiple levels. No evidence of neoplastic \par replacement of bone marrow. \par ALIGNMENT: Levels of anterolisthesis and retrolisthesis as noted above. Mild \par lumbar levoscoliosis. \par PARASPINAL SOFT TISSUES: There is no paraspinal mass. There is an abdominal \par aortic aneurysm. Aortic aneurysm measures 4.6 cm in AP diameter x 5 cm in \par width on image 27 series 6. There are circumaortic renal veins ventral and \par dorsal to the aneurysm. \par ADDITIONAL FINDINGS: There are numerous hepatic and renal lesions. Lesions are \par not fully characterized on lumbar MRI, but are consistent with cysts. Mildly \par enlarged prostate indents the base of the urinary bladder. There is a defect \par from previous TURP. There is a degenerative labral cyst at the right hip. \par IMPRESSION: Moderate central spinal stenosis L3-L4, L4-L5. Mild central spinal \par stenosis L2-L3. Levels of foramen stenosis and subarticular zone stenosis as \par described above. \par 4.6 cm (AP diameter) x 5 cm (width) infrarenal abdominal aortic aneurysm. \par Recommend vascular surgery consultation if this finding has not been \par previously evaluated. Recommend correlation with previous studies (if \par available) to document whether or not the aneurysm has enlarged. \par Electronically Signed:

## 2022-07-12 NOTE — PHYSICAL EXAM
[Normal muscle bulk without asymmetry] : normal muscle bulk without asymmetry [Spine: Flexion to ___ degrees, without pain] : spine: flexion to [unfilled] degrees, without pain [Normal] : Gait: normal [VASU Test] : positive VASU Test (Raffi's Test) [SI Provacative Testing] : positive SI Provacative Testing [] : Motor: [NL] : normal and symmetric bilaterally [de-identified] : .

## 2022-07-12 NOTE — HISTORY OF PRESENT ILLNESS
[Back Pain] : back pain [5] : an average pain level of 5/10 [9] : a maximum pain level of 9/10 [Aching] : aching [Transitioning] : transitioning [Bending] : bending [Rest] : rest [FreeTextEntry1] : Interval Note:\par He has seen Dr. Thorne last three years ago. Since then he has seen a neurosurgeon who recommended SIJ injection. After that he had two SIJ RFA w/ Dr. Bonilla in [place] with relief after the first ablation but not the second. Pain is localized to the right low back area. No radiation down the leg. Pain is worse with golfing. As her Dr. Thorne's notes he was previously, "s/p RIGHT L3-4 and L4-5 transforaminal epidural steroid injection 1/10/19 with 50% improvement in back pain and leg pain.  Continues to complain of right buttock pain when ambulating.  Patient saw Dr. Fredy MCKNIGHT who stated that he may benefit from SIJ steroid injection.  States that it is worse with bending, twisting and turning.  Denies any additional weakness, numbness, bowel/bladder dysfunction.  Pain intensity is 5/10\par \par Mr. JOSE MCARTHUR is a 78 year M with pmhx of  hlp on [id number] of primary prophylaxis, crf followed by Dr. Lee, bilateral knee replacement at [facility] 12 years ago, presents with right lower back pain without radiating, worse with golfing/tennis/ activity. Improves with rest. Pain started insiduously but worsened 6 weeks ago without inciting event. Denies weakness, numbness, bowel/bladder incontinence. Pain level is 5/10.\par \par  Pain therapies \par         PT - mild relief\par \par Previous nerve block or injection\par \par s/p RIGHT L3-4 and L4-5 transforaminal epidural steroid injection 1/10/19 \par         right L3-sacral ala medial branch block 11/5/18\par         right cluneal nerve block 7/5/18\par         right L3-4, L4-5 transforaminal epidural steroid iinjection\par         s/p L3-sacral ala right MBB rfa with 50% improvement\par         improved bilatearl L3-sacral ala mbb 10/12/17\par         LESI by Dr. Echavarria 10/2017 - minimal relief\par         \par \par  Imaging studies  \par \par         MRI LS 10/2017\par \par L1-2 minimal posterior step-off of L1 on L2, no central/foraminal narrowing\par         L2-3 market ligamentum hypertrophy and changes of facet arthrtiis with indentation of L3 nerve roots in lateral recess with moderate central canal narrowing. Mild neurofomirnal narrowing \par         L3-4 mild disc bulge, indenting the L4 nerve roots in the lateral recess. Marked ligamentum hypertrophy and chnages of marked facet arthritis, with modarete to severe central canal and mild neuroforminal narrowing.\par         L4-5 Minimal anterior step-off of L4 on L5, mild uncovering of disc. Marked ligamentum hypertrophy tehre is indentation of right greater than let L5 nerve roots and indentation especially from left posterior lateral thecal sac with trifoliated appearance of moderate/severe central canal and mild neuroformianl narrowing. Marked facet arthritis\par         L5-S1 slight disc bulge, indenting the left greater than right S1 nerve roots. Modreate-marked ligamentum hypertorphy indenting the posterior lateral aspect of the thecal cord. Facet arthritis with slight left neuroforimanl narrowing\par \par  [FreeTextEntry7] : Patient presents with  right lower back pain. The pain has been occurring for many years but worse in the last few months. The pain frequency is constant. The character of the pain is described as _, Throbbing, Dull. The patient reports pain level at NRS:  5/10. . With activity, the pain intensity increases to  7/10. The pain increases with activity. The pain is decreased by rest. Patient reports that perfoming activities of daily living very dfificult.

## 2022-07-12 NOTE — ASSESSMENT
[FreeTextEntry1] : 81 yom w/ right low back pain. \par \par Most likely SIJ related due to location of pain, pain to palpation of PSIS, and previous relief w/ SIJ RFA. Cannot rule out lumbar spine component of pain.\par \par The patient has failed to have relief with over six weeks of physical therapy within the last three months and all medications. GIven their failure to improve with all other conservative measures recommend MRI lumbar spine. Patient will return to review imaging and plan for potential intervention.\par \par Xrays of sacroiliac joint and flexion/extension films.\par \par Physical therapy prescribed - goal will be to increase ROM, strengthening, postural training, other modalities ad becky which may include massage and stim. Goals of therapy discussed with the patient in detail and will be discussed with physical therapist. Patient will follow-up following course of physical therapy to monitor progress and adjust therapy as needed.\par \par Acetaminophen 1,000 mg q8h prn for moderate pain. Risks, benefits, and alternatives of acetaminophen discussed with patient.\par \par Diet and nutritional strategies discussed which may improve patients pain and will improve overall health.\par \par

## 2022-07-20 ENCOUNTER — TRANSCRIPTION ENCOUNTER (OUTPATIENT)
Age: 82
End: 2022-07-20

## 2022-08-04 NOTE — H&P PST ADULT - GUM GEN PE MLT EXAM PC
[facility] Post Ablation Patient Instructions:  No lifting > 10 lbs x 1 week.      No soaking in baths, hot tubs, pools x 1 week.  May shower the day after discharge and take off groin dressings and leave  sites uncovered.  Continue to monitor sites daily for warmth, redness, discolored drainage.  It is common to have a small lump in the area where the cather was (usually the size of a marble); this will go away but takes approximately 6 weeks to normalize.     3.   Please take all medications as prescribed to you; please do not stop any medications prescribed post ablation unless directed by your healthcare provider.      4.   Please do not miss any doses of your blood thinner (if you have been started on, or take chronic blood thinners) without discussion with your healthcare provider first.     5.   Please walk and take deep breaths after discharge.  After discharge, if you experience neurological changes/signs of stroke or high fever you should be seen in the emergency dept.     6.   It is possible you may experience some chest discomfort or chest tightness post ablation.  This is usually secondary to inflammation and irritation of the tissues at the area of the ablation.  If this occurs, it is advised to try 400 mg of Ibuprofen with food as needed up to three times a day for a maximum of two days.  This should help to decrease pain and tissue inflammation.          **Please notify the office ([phone]) if this occurs.         ** DO NOT TAKE Ibuprofen IF HISTORY OF ALLERGY, SIGNIFICANT BLEEDING OR KIDNEY DISEASE WITHOUT DISCUSSING WITH YOUR CARDIOLOGY PROVIDER FIRST.          ** If pain becomes severe or you have additional symptoms you may need to be medically evaluated; please contact the cardiology office ([phone]) for further direction.     7. It is possible that you may experience arrhythmia/Atrial Fibrillation post ablation.  This is secondary to irritation and  inflammation of the cardiac tissues from the ablation.  If you have atrial fibrillation all day or feel poorly with it, please notify your cardiologist's via phone ([phone]) or [employer].      8.  Please contact call our office ([phone]) or message via [employer] message if you have any questions or concerns post procedurally.    9. You need to be seen for post ablation follow up 3-4 weeks post procedure. An appointment is scheduled for you.  Please contact the office ([phone]) if you need to change your appointment.        If any questions arise, call your provider.  If your provider is not available, please feel free to call the [facility] at [phone].    MEDICATIONS: Resume taking daily medication.  Take prescribed pain medication with food.  If no medication is prescribed, you may take non-aspirin pain medication if needed.  PAIN MEDICATION CAN BE VERY CONSTIPATING.  Take a stool softener or laxative such as senokot, pericolace, or milk of magnesia if needed.    Diet    Resume pre-operative diet upon discharge from the hospital. Depending on how you are feeling and whether you have nausea or not, you may wish to stay with a bland diet for the first few days. However, you can advance your diet as quickly as you feel ready.     Normal genitalia; no lesions; no discharge

## 2022-08-06 ENCOUNTER — RESULT REVIEW (OUTPATIENT)
Age: 82
End: 2022-08-06

## 2022-08-24 ENCOUNTER — APPOINTMENT (OUTPATIENT)
Dept: PAIN MANAGEMENT | Facility: CLINIC | Age: 82
End: 2022-08-24

## 2022-08-24 VITALS
WEIGHT: 160 LBS | SYSTOLIC BLOOD PRESSURE: 128 MMHG | HEIGHT: 69 IN | DIASTOLIC BLOOD PRESSURE: 74 MMHG | TEMPERATURE: 98 F | BODY MASS INDEX: 23.7 KG/M2

## 2022-08-24 DIAGNOSIS — M53.3 SACROCOCCYGEAL DISORDERS, NOT ELSEWHERE CLASSIFIED: ICD-10-CM

## 2022-08-24 PROCEDURE — 99214 OFFICE O/P EST MOD 30 MIN: CPT

## 2022-08-24 NOTE — PHYSICAL EXAM
[Normal muscle bulk without asymmetry] : normal muscle bulk without asymmetry [Spine: Flexion to ___ degrees, without pain] : spine: flexion to [unfilled] degrees, without pain [Normal] : Gait: normal [VASU Test] : positive VASU Test (Raffi's Test) [SI Provacative Testing] : positive SI Provacative Testing [] : Motor: [NL] : normal and symmetric bilaterally [de-identified] : Constitutional: Well-developed, in no acute distress\par \par Musculoskeletal:\par Lumbar Spine:   Gait: Antalgic\par 		Inspection: Normal curvature, no abnormal kyphosis or scoliosis\par 		Facet loading: pain bilaterally\par 		Palpation:\par 			Lumbar and paraspinal muscles: pain bilaterally\par 			Sacroiliac joint: no pain\par 			Greater trochanter: no pain\par 		Muscle Strength:\par 		Iliopsoas: 5/5 bilaterally\par 		Quadriceps: 5/5 bilaterally\par 		Hamstrings: 5/5 bilaterally\par 		Tibialis anterior: 5/5 bilaterally\par 		Extensor hallucis longus: 5/5 bilaterally\par \par 		Sensation: normal and equal in bilateral lower extremities\par \par Extremity: no edema noted\par Neurological: Memory normal, AAO x 3, Cranial nerves II - XII grossly normal\par Psychiatric: Appropriate mood and affect, oriented to time, place, person, and situation\par

## 2022-08-24 NOTE — HISTORY OF PRESENT ILLNESS
[6] : 3. What number best describes how, during the past week, pain has interfered with your general activity? 6/10 pain [Back Pain] : back pain [5] : an average pain level of 5/10 [9] : a maximum pain level of 9/10 [Aching] : aching [Transitioning] : transitioning [Bending] : bending [Rest] : rest [FreeTextEntry1] : Interval Note:\par His pain remains severe. He is here to review recent MRI and xrays. Pain remains on the right side of his low back. Quality of life is impaired. There has been a severe exacerbation of the patient's chronic pain.  \par \par He has seen Dr. Thorne last three years ago. Since then he has seen a neurosurgeon who recommended SIJ injection. After that he had two SIJ RFA w/ Dr. Bonilla in [place] with relief after the first ablation but not the second. Pain is localized to the right low back area. No radiation down the leg. Pain is worse with golfing. As her Dr. Thorne's notes he was previously, "s/p RIGHT L3-4 and L4-5 transforaminal epidural steroid injection 1/10/19 with 50% improvement in back pain and leg pain.  Continues to complain of right buttock pain when ambulating.  Patient saw Dr. Fredy MCKNIGHT who stated that he may benefit from SIJ steroid injection.  States that it is worse with bending, twisting and turning.  Denies any additional weakness, numbness, bowel/bladder dysfunction.  Pain intensity is 5/10\par \par Mr. JOSE MCARTHUR is a 78 year M with pmhx of  hlp on dod79ty of primary prophylaxis, crf followed by Dr. Lee, bilateral knee replacement at [facility] 12 years ago, presents with right lower back pain without radiating, worse with golfing/tennis/ activity. Improves with rest. Pain started insiduously but worsened 6 weeks ago without inciting event. Denies weakness, numbness, bowel/bladder incontinence. Pain level is 5/10.\par \par  Pain therapies \par         PT - mild relief\par \par Previous nerve block or injection\par \par s/p RIGHT L3-4 and L4-5 transforaminal epidural steroid injection 1/10/19 \par         right L3-sacral ala medial branch block 11/5/18\par         right cluneal nerve block 7/5/18\par         right L3-4, L4-5 transforaminal epidural steroid iinjection\par         s/p L3-sacral ala right MBB rfa with 50% improvement\par         improved bilatearl L3-sacral ala mbb 10/12/17\par         LESI by Dr. Echavarria 10/2017 - minimal relief\par         \par \par  Imaging studies  \par \par         MRI LS 10/2017\par \par L1-2 minimal posterior step-off of L1 on L2, no central/foraminal narrowing\par         L2-3 market ligamentum hypertrophy and changes of facet arthrtiis with indentation of L3 nerve roots in lateral recess with moderate central canal narrowing. Mild neurofomirnal narrowing \par         L3-4 mild disc bulge, indenting the L4 nerve roots in the lateral recess. Marked ligamentum hypertrophy and chnages of marked facet arthritis, with modarete to severe central canal and mild neuroforminal narrowing.\par         L4-5 Minimal anterior step-off of L4 on L5, mild uncovering of disc. Marked ligamentum hypertrophy tehre is indentation of right greater than let L5 nerve roots and indentation especially from left posterior lateral thecal sac with trifoliated appearance of moderate/severe central canal and mild neuroformianl narrowing. Marked facet arthritis\par         L5-S1 slight disc bulge, indenting the left greater than right S1 nerve roots. Modreate-marked ligamentum hypertorphy indenting the posterior lateral aspect of the thecal cord. Facet arthritis with slight left neuroforimanl narrowing\par \par  [FreeTextEntry2] : 16 [FreeTextEntry7] : Patient presents with  right lower back pain. The pain has been occurring for many years but worse in the last few months. The pain frequency is constant. The character of the pain is described as _, Throbbing, Dull. The patient reports pain level at NRS:  5/10. . With activity, the pain intensity increases to  7/10. The pain increases with activity. The pain is decreased by rest. Patient reports that perfoming activities of daily living very dfificult.

## 2022-08-24 NOTE — ASSESSMENT
[FreeTextEntry1] : 81 yom w/ right low back pain. \par \par I have personally reviewed the patient's MRI in detail and discussed it with them which is significant for a disc herniation at L5-S1 on the right.\par \par Most likely SIJ related due to location of pain, pain to palpation of PSIS, and previous relief w/ SIJ RFA. Cannot rule out lumbar spine component of pain.\par \par The patient has failed to have relief with medication management. The patient has failed to have relief with more then six weeks of physical therapy within the last three months. Given the patients failure to improve with all other conservative measures, recommend L5-S1 interlaminar epidural steroid injection under fluoroscopic guidance. The patient will follow-up with me in my office two weeks following intervention.\par \par I have discussed in detail with the patient that an interventional spine procedure is associated with potential risks. The procedure may include an injection of steroid and potentially other medications (local anesthetic and normal saline) into the epidural space or surrounding tissue of the spine. There are significant risks of this procedure which include and are not limited to infection, bleeding, worsening pain, dural puncture leading to post-dural puncture headache, nerve damage, spinal cord injury, paralysis, stroke, and death. There is a chance that the procedure does not improve their pain. There are risks associated with the steroid being absorbed into the body systemically. These include dysphoria, difficulty sleeping, mood swings, and personality changes. Pre-menopausal women may notice a regularity his in her menstrual cycle for 2-3 months following the injection. Steroids can specifically affect patients with hypertension, diabetes, and peptic ulcers. The procedure may cause a temporary increase in blood pressure and blood glucose, and may adversely affect a peptic ulcer. Other, more rare complications, including avascular necrosis of the joints, glaucoma, and osteoporosis. I have discussed the risks of the procedure at length with the patient, and the potential benefits of pain relief. I have offered alternatives to the procedure. All questions were answered. The patient expressed understanding and wishes to proceed with the procedure.\par \par Physical therapy prescribed - goal will be to increase ROM, strengthening, postural training, other modalities ad becky which may include massage and stim. Goals of therapy discussed with the patient in detail and will be discussed with physical therapist. Patient will follow-up following course of physical therapy to monitor progress and adjust therapy as needed.\par \par Acetaminophen 1,000 mg q8h prn for moderate pain. Risks, benefits, and alternatives of acetaminophen discussed with patient.\par \par Ibuprofen 600 mg q8h prn add when pain is not adequately controlled with acetaminophen. Risks, benefits, and alternatives of ibuprofen discussed with patient.\par \par Diet and nutritional strategies discussed which may improve patients pain and will improve overall health.\par

## 2022-08-24 NOTE — DATA REVIEWED
[FreeTextEntry1] : MRI LUMBAR SPINE (08/06/22):\par \par \par  There appear to be 5 nonrib-bearing lumbar type vertebral bodies. For purposes of this report,\par vertebral body at level of the iliolumbar ligament will be designated as L5. Inferiormost well-form\par ed intervertebral disc space will be designated as L5-S1. No MR evidence for transitional\par lumbosacral anatomy.\par \par \par  Lumbar lordosis is maintained. Mild L4-L5 anterolisthesis, L2-L3 retrolisthesis, L1-2\par retrolisthesis. Vertebral body heights are maintained. Vertebral body bone marrow signal within n\par ormal limits. No abnormal cord signal identified. Conus terminates at L2. No significant\par periarticular or paraspinal soft tissue edema is identified. There are multilevel degenerative\par endplate changes with osteophyte formation, intervertebral disc space narrowing/desiccation,\par Schmorl's nodes and endplate irregularity. No suspicious expansile or destructive osseous lesion\par identified. Paraspinal soft tissues are unremarkable. There are partially imaged T2 hyperintense\par lesions within the kidneys, nonspecific incompletely characterized may reflect benign cystic\par lesions.\par \par \par \par  There are multilevel findings as follows:\par \par  T12-L1: No significant canal or foraminal stenosis.\par \par  L1-L2: No significant canal or foraminal stenosis.\par \par  L2-L3: Disc bulge, bilateral facet arthropathy, ligamentous hypertrophy contributing to no\par significant canal stenosis and mild bilateral foraminal stenosis.\par  L3-L4: Disc bulge, bilateral facet arthropathy, ligamentous hypertrophy contributing to no\par significant canal stenosis and mild bilateral foraminal stenosis. Moderate bilateral lateral recess\par stenosis involving descending L4 nerve roots.\par  L4-L5: Disc bulge, bilateral facet arthropathy, ligamentous hypertrophy contributing to no\par significant canal stenosis and mild bilateral foraminal stenosis. Moderate bilateral lateral recess\par stenosis involving descending L5 nerve roots.\par  L5-S1: Disc bulge superimposed right paracentral disc protrusion, bilateral facet arthropathy,\par ligamentous hypertrophy contributing to no significant canal stenosis and mild bilateral foraminal\par stenosis. Mild to moderate bilateral lateral recess stenosis involving descending S1 nerve roots.\par \par \par \par \par  IMPRESSION:\par \par  Multilevel lumbar spondylitic changes as detailed above notable for mild bilateral foraminal\par stenosis at L2-L3, L3-L4, L4-L5 and L5-S1 involving exiting L2, L3, L4, L5 nerve roots, respectively\par and moderate bilateral lateral recess stenosis at L3-L4, L4-L5 involving descending L4, L5 nerve\par roots, respectively. No significant canal stenosis of the lumbar spine.\par \par

## 2022-08-31 ENCOUNTER — APPOINTMENT (OUTPATIENT)
Dept: PAIN MANAGEMENT | Facility: CLINIC | Age: 82
End: 2022-08-31

## 2022-08-31 VITALS
HEIGHT: 69 IN | SYSTOLIC BLOOD PRESSURE: 137 MMHG | OXYGEN SATURATION: 100 % | RESPIRATION RATE: 16 BRPM | BODY MASS INDEX: 23.7 KG/M2 | WEIGHT: 160 LBS | DIASTOLIC BLOOD PRESSURE: 77 MMHG | HEART RATE: 72 BPM

## 2022-08-31 PROCEDURE — 62323 NJX INTERLAMINAR LMBR/SAC: CPT

## 2022-08-31 NOTE — PROCEDURE
[FreeTextEntry1] : PROCEDURE: L5-S1 Interlaminar epidural steroid injection under fluoroscopic guidance.\par CHIEF COMPLAINT: Low back and leg pain.  \par \par PREOPERATIVE DIAGNOSIS:	Lumbar radiculopathy.\par \par POSTOPERATIVE DIAGNOSIS:  Lumbar radiculopathy. 	\par \par ANESTHESIA:  Local.\par \par COMPLICATIONS:  	None.\par \par ESTIMATED BLOOD LOSS:  None.	\par \par INDICATIONS: Mr. Arias is a very pleasant 82 yom who has significant low back and leg pain.  The patient has failed to have relief with medication management and physical therapy. Given their failure to improve with all other conservative measures, it was determined that the patient would benefit from a L5-S1 interlaminar epidural steroid injection under fluoroscopic guidance.  \par \par The patient denies any fevers, chills, nausea, vomiting, diarrhea, constipation, chest pain, shortness of breath, or burning on urination.  They deny any latex allergy.  They are not taking any anticoagulants and do not have a history of coagulopathy.  The patient denies any IV contrast allergy.     \par \par PROCEDURE COURSE: The risks, benefits, and alternatives of the lumbar interlaminar epidural steroid injection were explained to the patient.  All questions were answered to their own satisfaction.  Written consent was signed and placed in the chart. The patient’s low back was marked in the preoperative holding area. \par \par The patient was brought to the Operating Room and placed in the prone position with a pillow under the abdomen to reduce lumbar lordosis.  A time-out was taken identifying the patient, the procedure, the site and side, and the patient’s allergies.  ASA standard monitors were applied for monitoring throughout the procedure.  The patient’s back was prepped and draped with Chloroprep in the usual sterile fashion and sterile technique was adhered to throughout the entire procedure.\par \par The lumbar spine was visualized under fluoroscopy in the AP view. The 12th rib and T12 vertebra were identified as a reference point and the lumbar vertebral bodies were counted.  The L5 vertebral body was then squared. The vertebral body and the superior and inferior end plates were aligned and the spinous processes were adjusted until midline. The L5-S1 interlaminar space was identified under fluoroscopic guidance and a caudal tilt was utilized to optimize the opening of the interlaminar space. The skin and subcutaneous tissues were infiltrated with 1% Lidocaine.  After adequate local anesthesia was obtained, a 20g Tuohy needle was inserted at L5-S1 interspace. The needle was carefully advanced, alternating between AP and lateral views, to ensure appropriate trajectory and depth. Once the ligamentum flavum was engaged, a sterile glass syringe was employed and a loss of resistance to air technique was utilized to identify the epidural space. Once obtained, negative aspiration for hem and CSF was obtained, further confirming location. Following this, 1 cc of contrast was administered and epidural spread was confirmed in the AP and lateral views. Following this, 80 mg of methylprednisolone and 2 cc of 1% lidocaine was slowly administered in incremental amounts.\par \par All injections were done with negative aspiration for cerebrospinal fluid or heme, and all needles were withdrawn without incident. I personally met with the patient following the procedure and all questions were answered. The patient tolerated the procedure well without any apparent complications and was transferred to the Recovery Room in stable condition. The patient was provided with discharge instructions and will follow-up with me in my office.\par \par 	___________________________________

## 2022-09-14 ENCOUNTER — APPOINTMENT (OUTPATIENT)
Dept: PAIN MANAGEMENT | Facility: CLINIC | Age: 82
End: 2022-09-14

## 2022-09-14 VITALS
TEMPERATURE: 98 F | DIASTOLIC BLOOD PRESSURE: 64 MMHG | BODY MASS INDEX: 23.7 KG/M2 | HEIGHT: 69 IN | SYSTOLIC BLOOD PRESSURE: 110 MMHG | WEIGHT: 160 LBS

## 2022-09-14 PROCEDURE — 99213 OFFICE O/P EST LOW 20 MIN: CPT

## 2022-09-14 NOTE — PHYSICAL EXAM
[Normal muscle bulk without asymmetry] : normal muscle bulk without asymmetry [Spine: Flexion to ___ degrees, without pain] : spine: flexion to [unfilled] degrees, without pain [Normal] : Gait: normal [VASU Test] : positive VASU Test (Raffi's Test) [SI Provacative Testing] : positive SI Provacative Testing [] : Motor: [NL] : normal and symmetric bilaterally [de-identified] : Constitutional: Well-developed, in no acute distress\par \par Musculoskeletal:\par Lumbar Spine:   Gait: Antalgic\par 		Inspection: Normal curvature, no abnormal kyphosis or scoliosis\par 		Facet loading: pain bilaterally\par 		Palpation:\par 			Lumbar and paraspinal muscles: pain bilaterally\par 			Sacroiliac joint: no pain\par 			Greater trochanter: no pain\par 		Muscle Strength:\par 		Iliopsoas: 5/5 bilaterally\par 		Quadriceps: 5/5 bilaterally\par 		Hamstrings: 5/5 bilaterally\par 		Tibialis anterior: 5/5 bilaterally\par 		Extensor hallucis longus: 5/5 bilaterally\par \par 		Sensation: normal and equal in bilateral lower extremities\par \par Extremity: no edema noted\par Neurological: Memory normal, AAO x 3, Cranial nerves II - XII grossly normal\par Psychiatric: Appropriate mood and affect, oriented to time, place, person, and situation\par

## 2022-09-14 NOTE — HISTORY OF PRESENT ILLNESS
[6] : 3. What number best describes how, during the past week, pain has interfered with your general activity? 6/10 pain [Back Pain] : back pain [5] : an average pain level of 5/10 [9] : a maximum pain level of 9/10 [Aching] : aching [Transitioning] : transitioning [Bending] : bending [Rest] : rest [FreeTextEntry1] : Interval Note:\par His pain improved dramatically after recent PIERRE. No other new complaints.\par \par He has seen Dr. Thorne last three years ago. Since then he has seen a neurosurgeon who recommended SIJ injection. After that he had two SIJ RFA w/ Dr. Bonilla in [place] with relief after the first ablation but not the second. Pain is localized to the right low back area. No radiation down the leg. Pain is worse with golfing. As her Dr. Thorne's notes he was previously, "s/p RIGHT L3-4 and L4-5 transforaminal epidural steroid injection 1/10/19 with 50% improvement in back pain and leg pain.  Continues to complain of right buttock pain when ambulating.  Patient saw Dr. Fredy MCKNIGHT who stated that he may benefit from SIJ steroid injection.  States that it is worse with bending, twisting and turning.  Denies any additional weakness, numbness, bowel/bladder dysfunction.  Pain intensity is 5/10\par \par Mr. JOSE MCARTHUR is a 78 year M with pmhx of  hlp on [id number] of primary prophylaxis, crf followed by Dr. Lee, bilateral knee replacement at [facility] 12 years ago, presents with right lower back pain without radiating, worse with golfing/tennis/ activity. Improves with rest. Pain started insiduously but worsened 6 weeks ago without inciting event. Denies weakness, numbness, bowel/bladder incontinence. Pain level is 5/10.\par \par Interventions:\par L5-S1 interlaminar PIERRE (08/31/22):\par \par s/p RIGHT L3-4 and L4-5 transforaminal epidural steroid injection 1/10/19 \par         right L3-sacral ala medial branch block 11/5/18\par         right cluneal nerve block 7/5/18\par         right L3-4, L4-5 transforaminal epidural steroid iinjection\par         s/p L3-sacral ala right MBB rfa with 50% improvement\par         improved bilatearl L3-sacral ala mbb 10/12/17\par         LESI by Dr. Echavarria 10/2017 - minimal relief\par         \par \par \par \par  [FreeTextEntry2] : 17 [FreeTextEntry7] : Patient presents with  right lower back pain. The pain has been occurring for many years but worse in the last few months. The pain frequency is constant. The character of the pain is described as _, Throbbing, Dull. The patient reports pain level at NRS:  5/10. . With activity, the pain intensity increases to  7/10. The pain increases with activity. The pain is decreased by rest. Patient reports that perfoming activities of daily living very dfificult.

## 2022-09-14 NOTE — ASSESSMENT
[FreeTextEntry1] : 81 yom w/ right low back pain much improved after PIERRE. \par \par I have personally reviewed the patient's MRI in detail and discussed it with them which is significant for a disc herniation at L5-S1 on the right.\par \par As he is doing well continue conservative management.\par \par Physical therapy prescribed - goal will be to increase ROM, strengthening, postural training, other modalities ad becky which may include massage and stim. Goals of therapy discussed with the patient in detail and will be discussed with physical therapist. Patient will follow-up following course of physical therapy to monitor progress and adjust therapy as needed.\par \par Acetaminophen 1,000 mg q8h prn for moderate pain. Risks, benefits, and alternatives of acetaminophen discussed with patient.\par \par Ibuprofen 600 mg q8h prn add when pain is not adequately controlled with acetaminophen. Risks, benefits, and alternatives of ibuprofen discussed with patient.\par

## 2022-09-26 NOTE — REVIEW OF SYSTEMS
[Joint Stiffness] : joint stiffness [Back Pain] : back pain [Negative] : Heme/Lymph Ilumya Counseling: I discussed with the patient the risks of tildrakizumab including but not limited to immunosuppression, malignancy, posterior leukoencephalopathy syndrome, and serious infections.  The patient understands that monitoring is required including a PPD at baseline and must alert us or the primary physician if symptoms of infection or other concerning signs are noted.

## 2022-11-03 ENCOUNTER — RX RENEWAL (OUTPATIENT)
Age: 82
End: 2022-11-03

## 2022-11-03 RX ORDER — AMLODIPINE BESYLATE 10 MG/1
10 TABLET ORAL TWICE DAILY
Qty: 90 | Refills: 1 | Status: ACTIVE | COMMUNITY
Start: 2022-11-03 | End: 1900-01-01

## 2023-01-01 ENCOUNTER — RX RENEWAL (OUTPATIENT)
Age: 83
End: 2023-01-01

## 2023-01-01 RX ORDER — ROSUVASTATIN CALCIUM 10 MG/1
10 TABLET, FILM COATED ORAL
Qty: 90 | Refills: 3 | Status: ACTIVE | COMMUNITY
Start: 2020-01-02 | End: 1900-01-01

## 2023-01-09 ENCOUNTER — APPOINTMENT (OUTPATIENT)
Dept: FAMILY MEDICINE | Facility: CLINIC | Age: 83
End: 2023-01-09
Payer: MEDICARE

## 2023-01-09 VITALS — RESPIRATION RATE: 16 BRPM | HEART RATE: 74 BPM | TEMPERATURE: 98.1 F | OXYGEN SATURATION: 97 %

## 2023-01-09 DIAGNOSIS — R05.8 OTHER SPECIFIED COUGH: ICD-10-CM

## 2023-01-09 DIAGNOSIS — Z77.22 CONTACT WITH AND (SUSPECTED) EXPOSURE TO ENVIRONMENTAL TOBACCO SMOKE (ACUTE) (CHRONIC): ICD-10-CM

## 2023-01-09 PROCEDURE — 99214 OFFICE O/P EST MOD 30 MIN: CPT | Mod: 25

## 2023-01-09 RX ORDER — AZITHROMYCIN 250 MG/1
250 TABLET, FILM COATED ORAL
Qty: 1 | Refills: 0 | Status: ACTIVE | COMMUNITY
Start: 2023-01-09 | End: 1900-01-01

## 2023-01-09 RX ORDER — METHYLPREDNISOLONE 4 MG/1
4 TABLET ORAL
Qty: 1 | Refills: 0 | Status: ACTIVE | COMMUNITY
Start: 2023-01-09 | End: 1900-01-01

## 2023-01-09 NOTE — PHYSICAL EXAM
[Normal] : no jugular venous distention, supple, no lymphadenopathy and the thyroid was normal and there were no nodules present [de-identified] : decreased air entry, rhonchis

## 2023-01-09 NOTE — PLAN
[FreeTextEntry1] : Referred pulmonary\par Referred for LDCT chest\par Trial of Z-pack and Medrol pack\par Call if not well

## 2023-01-09 NOTE — HISTORY OF PRESENT ILLNESS
[FreeTextEntry8] : Productive cough x 12 days\par Denies fever, chills\par Has a history of second hand heavy smoking (patient's wife) x 55 years\par Also the patient is a cigar smoker\par The patient is known with COPD\par Recommended to have pulmonary cancer LDCT screening and to connect with a pulmonologist for COPD management

## 2023-01-23 NOTE — PROGRESS NOTE ADULT - SUBJECTIVE AND OBJECTIVE BOX
24 hr events:    SUBJECTIVE:    Flatus: [ ] YES [ ] NO             Bowel Movement: [ ] YES [ ] NO  Pain (0-10):            Pain Control Adequate: [ ] YES [ ] NO  Nausea: [ ] YES [ ] NO            Vomiting: [ ] YES [ ] NO  Diarrhea: [ ] YES [ ] NO         Constipation: [ ] YES [ ] NO     Chest Pain: [ ] YES [ ] NO    SOB:  [ ] YES [ ] NO    MEDICATIONS  (STANDING):  amLODIPine   Tablet 10 milliGRAM(s) Oral daily  atorvastatin 40 milliGRAM(s) Oral at bedtime  heparin  Injectable 5000 Unit(s) SubCutaneous every 8 hours  insulin lispro (HumaLOG) corrective regimen sliding scale   SubCutaneous every 6 hours  lactated ringers. 1000 milliLiter(s) (125 mL/Hr) IV Continuous <Continuous>  niCARdipine Infusion 5 mG/Hr (25 mL/Hr) IV Continuous <Continuous>    MEDICATIONS  (PRN):  HYDROmorphone  Injectable 0.5 milliGRAM(s) IV Push every 3 hours PRN Moderate Pain (4 - 6)      Etienne:	  [ ] None	[ ] Daily Etienne Order Placed	   Indication:	  [ ] Strict I and O's    [ ] Obstruction     [ ] Incontinence + Stage 3 or 4 Decubitus  Central Line:  [ ] None	   [ ]  Medication / TPN Administration     [ ] No Peripheral IV     ICU Vital Signs Last 24 Hrs  T(C): 36.8 (06 Feb 2019 09:55), Max: 37.3 (06 Feb 2019 01:25)  T(F): 98.2 (06 Feb 2019 09:55), Max: 99.1 (06 Feb 2019 01:25)  HR: 100 (06 Feb 2019 13:00) (88 - 110)  BP: 114/75 (06 Feb 2019 13:00) (114/75 - 153/82)  BP(mean): 83 (06 Feb 2019 13:00) (83 - 113)  ABP: 120/72 (06 Feb 2019 07:00) (96/84 - 158/72)  ABP(mean): 92 (06 Feb 2019 07:00) (90 - 120)  RR: 19 (06 Feb 2019 13:00) (11 - 27)  SpO2: 96% (06 Feb 2019 13:00) (94% - 100%)      Physical Exam:  General: NAD  HEENT: NC/AT, EOMI, PERRLA, normal hearing, no oral lesions, neck supple w/o LAD  Pulmonary: Nonlabored breathing, no respiratory distress, CTA-B  Cardiovascular: NSR, no murmurs  Abdominal: soft, NT/ND, +BS, no organomegaly  Extremities: WWP, 5/5 strength x 4, no clubbing/cyanosis/edema  Neuro: A/O x3, CNs II-XII grossly intact, normal motor/sensation, no focal deficits  Pulses: palpable distal pulses    Lines/tubes/drains:    Vent settings:      I&O's Summary    05 Feb 2019 07:01  -  06 Feb 2019 07:00  --------------------------------------------------------  IN: 2452 mL / OUT: 1324 mL / NET: 1128 mL    06 Feb 2019 07:01  -  06 Feb 2019 13:35  --------------------------------------------------------  IN: 525 mL / OUT: 375 mL / NET: 150 mL        LABS:                        10.9   14.39 )-----------( 303      ( 06 Feb 2019 05:08 )             33.3     02-06    140  |  107  |  38<H>  ----------------------------<  149<H>  5.1   |  21<L>  |  2.21<H>    Ca    9.3      06 Feb 2019 05:08  Phos  5.2     02-06  Mg     1.7     02-06      PT/INR - ( 05 Feb 2019 16:51 )   PT: 12.9 sec;   INR: 1.14          PTT - ( 05 Feb 2019 16:51 )  PTT:46.6 sec    CAPILLARY BLOOD GLUCOSE      POCT Blood Glucose.: 122 mg/dL (06 Feb 2019 11:42)  POCT Blood Glucose.: 129 mg/dL (06 Feb 2019 06:45)  POCT Blood Glucose.: 139 mg/dL (05 Feb 2019 21:58)  POCT Blood Glucose.: 174 mg/dL (05 Feb 2019 16:06)        Cultures:    Drips:    RADIOLOGY & ADDITIONAL STUDIES: No acute events overnight. Denies n/v/CP/SOB Pain well controlled.     ICU Vital Signs Last 24 Hrs  T(C): 36.8 (06 Feb 2019 09:55), Max: 37.3 (06 Feb 2019 01:25)  T(F): 98.2 (06 Feb 2019 09:55), Max: 99.1 (06 Feb 2019 01:25)  HR: 100 (06 Feb 2019 13:00) (88 - 110)  BP: 114/75 (06 Feb 2019 13:00) (114/75 - 153/82)  BP(mean): 83 (06 Feb 2019 13:00) (83 - 113)  ABP: 120/72 (06 Feb 2019 07:00) (96/84 - 158/72)  ABP(mean): 92 (06 Feb 2019 07:00) (90 - 120)  RR: 19 (06 Feb 2019 13:00) (11 - 27)  SpO2: 96% (06 Feb 2019 13:00) (94% - 100%)      Physical Exam:  Neuro: A&Ox3, NAD  HEENT: PERRLA, EOMI, MMM  Neck: Supple  CV: RRR  Pulm: CTAB, no wheezes, rales, or rhonchi  Abd: s, appropriately ttp, mild distention, dressing clean/dry  : Etienne in place  Ext: No edema peripherally or centrally  Vasc: 2+ DP/PT b/l      I&O's Summary    05 Feb 2019 07:01  -  06 Feb 2019 07:00  --------------------------------------------------------  IN: 2452 mL / OUT: 1324 mL / NET: 1128 mL    06 Feb 2019 07:01  -  06 Feb 2019 13:35  --------------------------------------------------------  IN: 525 mL / OUT: 375 mL / NET: 150 mL        LABS:                        10.9   14.39 )-----------( 303      ( 06 Feb 2019 05:08 )             33.3     02-06    140  |  107  |  38<H>  ----------------------------<  149<H>  5.1   |  21<L>  |  2.21<H>    Ca    9.3      06 Feb 2019 05:08  Phos  5.2     02-06  Mg     1.7     02-06      PT/INR - ( 05 Feb 2019 16:51 )   PT: 12.9 sec;   INR: 1.14          PTT - ( 05 Feb 2019 16:51 )  PTT:46.6 sec    CAPILLARY BLOOD GLUCOSE      POCT Blood Glucose.: 122 mg/dL (06 Feb 2019 11:42)  POCT Blood Glucose.: 129 mg/dL (06 Feb 2019 06:45)  POCT Blood Glucose.: 139 mg/dL (05 Feb 2019 21:58)  POCT Blood Glucose.: 174 mg/dL (05 Feb 2019 16:06)        Cultures:    Drips:    RADIOLOGY & ADDITIONAL STUDIES: No acute events overnight. Denies n/v/CP/SOB Pain well controlled.     ICU Vital Signs Last 24 Hrs  T(C): 36.8 (06 Feb 2019 09:55), Max: 37.3 (06 Feb 2019 01:25)  T(F): 98.2 (06 Feb 2019 09:55), Max: 99.1 (06 Feb 2019 01:25)  HR: 100 (06 Feb 2019 13:00) (88 - 110)  BP: 114/75 (06 Feb 2019 13:00) (114/75 - 153/82)  BP(mean): 83 (06 Feb 2019 13:00) (83 - 113)  ABP: 120/72 (06 Feb 2019 07:00) (96/84 - 158/72)  ABP(mean): 92 (06 Feb 2019 07:00) (90 - 120)  RR: 19 (06 Feb 2019 13:00) (11 - 27)  SpO2: 96% (06 Feb 2019 13:00) (94% - 100%)      Physical Exam:  Neuro: A&Ox3, NAD  HEENT: PERRLA, EOMI, MMM  Neck: Supple  CV: RRR  Pulm: CTAB, no wheezes, rales, or rhonchi  Abd: s, appropriately ttp, mild distention, dressing clean/dry  : Etienne in place  Ext: No edema peripherally or centrally  Vasc: 2+ DP/PT b/l  Skin: no rash  MSK: no joint swelling  Psych: affect appropriate      I&O's Summary    05 Feb 2019 07:01  -  06 Feb 2019 07:00  --------------------------------------------------------  IN: 2452 mL / OUT: 1324 mL / NET: 1128 mL    06 Feb 2019 07:01  -  06 Feb 2019 13:35  --------------------------------------------------------  IN: 525 mL / OUT: 375 mL / NET: 150 mL        LABS:                        10.9   14.39 )-----------( 303      ( 06 Feb 2019 05:08 )             33.3     02-06    140  |  107  |  38<H>  ----------------------------<  149<H>  5.1   |  21<L>  |  2.21<H>    Ca    9.3      06 Feb 2019 05:08  Phos  5.2     02-06  Mg     1.7     02-06      PT/INR - ( 05 Feb 2019 16:51 )   PT: 12.9 sec;   INR: 1.14          PTT - ( 05 Feb 2019 16:51 )  PTT:46.6 sec    CAPILLARY BLOOD GLUCOSE      POCT Blood Glucose.: 122 mg/dL (06 Feb 2019 11:42)  POCT Blood Glucose.: 129 mg/dL (06 Feb 2019 06:45)  POCT Blood Glucose.: 139 mg/dL (05 Feb 2019 21:58)  POCT Blood Glucose.: 174 mg/dL (05 Feb 2019 16:06)        Cultures:    Drips:    RADIOLOGY & ADDITIONAL STUDIES: [Feeling Fatigued] : feeling fatigued [Dyspnea on exertion] : dyspnea during exertion [Negative] : Heme/Lymph [SOB] : no shortness of breath [Chest Discomfort] : no chest discomfort [Leg Claudication] : no intermittent leg claudication [Palpitations] : no palpitations [Syncope] : no syncope

## 2023-05-15 ENCOUNTER — APPOINTMENT (OUTPATIENT)
Dept: UROLOGY | Facility: CLINIC | Age: 83
End: 2023-05-15
Payer: MEDICARE

## 2023-05-15 VITALS
BODY MASS INDEX: 23.7 KG/M2 | SYSTOLIC BLOOD PRESSURE: 137 MMHG | HEIGHT: 69 IN | WEIGHT: 160 LBS | DIASTOLIC BLOOD PRESSURE: 64 MMHG | HEART RATE: 79 BPM

## 2023-05-15 DIAGNOSIS — F17.200 NICOTINE DEPENDENCE, UNSPECIFIED, UNCOMPLICATED: ICD-10-CM

## 2023-05-15 DIAGNOSIS — Z87.448 PERSONAL HISTORY OF OTHER DISEASES OF URINARY SYSTEM: ICD-10-CM

## 2023-05-15 DIAGNOSIS — N40.1 BENIGN PROSTATIC HYPERPLASIA WITH LOWER URINARY TRACT SYMPMS: ICD-10-CM

## 2023-05-15 PROCEDURE — 99203 OFFICE O/P NEW LOW 30 MIN: CPT

## 2023-05-15 RX ORDER — TAMSULOSIN HYDROCHLORIDE 0.4 MG/1
0.4 CAPSULE ORAL
Qty: 30 | Refills: 1 | Status: ACTIVE | COMMUNITY
Start: 2023-05-15 | End: 1900-01-01

## 2023-05-16 ENCOUNTER — APPOINTMENT (OUTPATIENT)
Dept: FAMILY MEDICINE | Facility: CLINIC | Age: 83
End: 2023-05-16
Payer: MEDICARE

## 2023-05-16 VITALS
SYSTOLIC BLOOD PRESSURE: 143 MMHG | WEIGHT: 160 LBS | HEART RATE: 79 BPM | DIASTOLIC BLOOD PRESSURE: 79 MMHG | OXYGEN SATURATION: 97 % | RESPIRATION RATE: 16 BRPM | HEIGHT: 69 IN | TEMPERATURE: 99.2 F | BODY MASS INDEX: 23.7 KG/M2

## 2023-05-16 DIAGNOSIS — K21.9 GASTRO-ESOPHAGEAL REFLUX DISEASE W/OUT ESOPHAGITIS: ICD-10-CM

## 2023-05-16 DIAGNOSIS — I10 ESSENTIAL (PRIMARY) HYPERTENSION: ICD-10-CM

## 2023-05-16 DIAGNOSIS — R05.3 CHRONIC COUGH: ICD-10-CM

## 2023-05-16 PROCEDURE — 99214 OFFICE O/P EST MOD 30 MIN: CPT

## 2023-05-16 RX ORDER — PANTOPRAZOLE 40 MG/1
40 TABLET, DELAYED RELEASE ORAL
Qty: 30 | Refills: 2 | Status: ACTIVE | COMMUNITY
Start: 2023-05-16 | End: 1900-01-01

## 2023-05-16 NOTE — PLAN
[FreeTextEntry1] : BP well controlled\par F/U chronic kidney disease with Dr Nazario\par Pending MRI of the abdomen with contrast, to discuss with nephrology if not contraindicated\par Educated about GERD diet and life style\par Received print out with recommendations\par Short course of PPI\par 30 min spenr with the patient\par F/u PRN\par \par

## 2023-05-16 NOTE — ASSESSMENT
[FreeTextEntry1] : Based off patient's symptoms, clinical presentation, and physical exam, this patient most likely has GERD. \par Advised pt to follow diet modifications- reducing citrus in the morning, caffeine, alcohol, etc.\par Prescribed pantoprazole to be taken once a day in the morning before breakfast.\par Elevate head at night with 2-3 pillows instead of laying flat. \par F/u with office 2-3 weeks after following recommendations. \par

## 2023-05-16 NOTE — HISTORY OF PRESENT ILLNESS
[FreeTextEntry8] : Mr. Arias is an 82 year old male presenting for acute visit regarding a cough he experiences at night and in the morning. Patient feels that the cough is coming from this "throat" rather than his lungs. Has concern for bronchitis. Denies fever, chills, congestion, weight loss.\par He has also end stage renal disease

## 2023-05-31 PROBLEM — N40.1 ENLARGED PROSTATE WITH LOWER URINARY TRACT SYMPTOMS (LUTS): Status: ACTIVE | Noted: 2018-12-19

## 2023-05-31 PROBLEM — Z87.448 HISTORY OF CHRONIC KIDNEY DISEASE: Status: RESOLVED | Noted: 2018-12-21 | Resolved: 2023-05-31

## 2023-05-31 PROBLEM — F17.200 CURRENT EVERY DAY SMOKER: Status: ACTIVE | Noted: 2018-12-19

## 2023-05-31 NOTE — PHYSICAL EXAM
[General Appearance - Well Developed] : well developed [Normal Appearance] : normal appearance [General Appearance - In No Acute Distress] : no acute distress [Respiration, Rhythm And Depth] : normal respiratory rhythm and effort [Abdomen Soft] : soft [Abdomen Hernia] : no hernia was discovered [Costovertebral Angle Tenderness] : no ~M costovertebral angle tenderness [Urethral Meatus] : meatus normal [Penis Abnormality] : normal circumcised penis [Scrotum] : the scrotum was normal [Epididymis] : the epididymides were normal [Testes Tenderness] : no tenderness of the testes [Testes Mass (___cm)] : there were no testicular masses [Anus Abnormality] : the anus and perineum were normal [Rectal Exam - Rectum] : digital rectal exam was normal [Prostate Tenderness] : the prostate was not tender [No Prostate Nodules] : no prostate nodules [Prostate Size ___ gm] : prostate size [unfilled] gm [] : no rash [Oriented To Time, Place, And Person] : oriented to person, place, and time [Inguinal Lymph Nodes Enlarged Bilaterally] : inguinal

## 2023-05-31 NOTE — ASSESSMENT
[FreeTextEntry1] : Patient is a 82 year male who presents with increasing urgency and frequency over the last 6 months.  He has a history of BPH and had a laser TURP with an outside urologist 4 years ago.  He has not had any hematuria or dysuria but his frequency is now every hour to hour and a half and gets up 4-6 times at night.  He is also noted that his force of stream is much decreased from right after surgery.  No interval hematuria or pelvic pain.  Postvoid residual today is 30 mL and digital rectal exam is normal.\par \par Assessment and plan\par 1.  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  Prostate cancer screening\par Digital rectal exam is normal and PVR shows he is emptying.  We will start Flomax 0.4 mg p.o. nightly.  Usage and the risks and benefits of the medication were discussed in detail he knows to be mindful and on the look out for lightheadedness or dizziness.  Return to the office in 1 month.\par

## 2023-06-19 ENCOUNTER — APPOINTMENT (OUTPATIENT)
Dept: UROLOGY | Facility: CLINIC | Age: 83
End: 2023-06-19
Payer: MEDICARE

## 2023-06-19 VITALS
WEIGHT: 160 LBS | SYSTOLIC BLOOD PRESSURE: 137 MMHG | HEART RATE: 80 BPM | BODY MASS INDEX: 23.7 KG/M2 | DIASTOLIC BLOOD PRESSURE: 76 MMHG | HEIGHT: 69 IN

## 2023-06-19 DIAGNOSIS — Z87.438 PERSONAL HISTORY OF OTHER DISEASES OF MALE GENITAL ORGANS: ICD-10-CM

## 2023-06-19 PROCEDURE — 99213 OFFICE O/P EST LOW 20 MIN: CPT

## 2023-06-19 NOTE — ASSESSMENT
[FreeTextEntry1] : Patient is a 82 year male with BPH with obstruction and nocturia.  He had a history of a laser TURP about 5 years ago with return of his symptoms.  I started Flomax 0.4 mg nightly last month.  He has noted some very mild increase in force of stream but still persistent frequency, urgency and nocturia.  He has had no interval hematuria or pelvic pain and no new modifying factors.\par \par Assessment and plan\par 1.  BPH with obstruction\par 2.  Nocturia\par We will schedule cystoscopy to assess\par

## 2023-07-13 ENCOUNTER — APPOINTMENT (OUTPATIENT)
Dept: UROLOGY | Facility: CLINIC | Age: 83
End: 2023-07-13
Payer: MEDICARE

## 2023-07-13 ENCOUNTER — APPOINTMENT (OUTPATIENT)
Dept: PAIN MANAGEMENT | Facility: CLINIC | Age: 83
End: 2023-07-13
Payer: MEDICARE

## 2023-07-13 VITALS
HEIGHT: 69 IN | WEIGHT: 160 LBS | BODY MASS INDEX: 23.7 KG/M2 | SYSTOLIC BLOOD PRESSURE: 134 MMHG | DIASTOLIC BLOOD PRESSURE: 69 MMHG

## 2023-07-13 VITALS
HEIGHT: 69 IN | WEIGHT: 160 LBS | DIASTOLIC BLOOD PRESSURE: 65 MMHG | SYSTOLIC BLOOD PRESSURE: 131 MMHG | BODY MASS INDEX: 23.7 KG/M2 | HEART RATE: 79 BPM

## 2023-07-13 DIAGNOSIS — M47.817 SPONDYLOSIS W/OUT MYELOPATHY OR RADICULOPATHY, LUMBOSACRAL REGION: ICD-10-CM

## 2023-07-13 PROCEDURE — 99214 OFFICE O/P EST MOD 30 MIN: CPT

## 2023-07-13 PROCEDURE — 76872 US TRANSRECTAL: CPT

## 2023-07-13 PROCEDURE — 52000 CYSTOURETHROSCOPY: CPT

## 2023-07-13 NOTE — HISTORY OF PRESENT ILLNESS
[6] : 3. What number best describes how, during the past week, pain has interfered with your general activity? 6/10 pain [Back Pain] : back pain [5] : an average pain level of 5/10 [9] : a maximum pain level of 9/10 [Aching] : aching [Transitioning] : transitioning [Bending] : bending [Rest] : rest [FreeTextEntry1] : Interval Note:\par His pain improved dramatically after recent PIERRE. No other new complaints. Pain just recently returned. Quality of life is impaired. There has been a severe exacerbation of the patient's chronic pain. Wishes for repeat PIERRE.\par \par He has seen Dr. Thorne last three years ago. Since then he has seen a neurosurgeon who recommended SIJ injection. After that he had two SIJ RFA w/ Dr. Bonilla in [place] with relief after the first ablation but not the second. Pain is localized to the right low back area. No radiation down the leg. Pain is worse with golfing. As her Dr. Thorne's notes he was previously, "s/p RIGHT L3-4 and L4-5 transforaminal epidural steroid injection 1/10/19 with 50% improvement in back pain and leg pain.  Continues to complain of right buttock pain when ambulating.  Patient saw Dr. Fredy MCKNIGHT who stated that he may benefit from SIJ steroid injection.  States that it is worse with bending, twisting and turning.  Denies any additional weakness, numbness, bowel/bladder dysfunction.  Pain intensity is 5/10\par \par Mr. JOSE MCARTHUR is a 78 year M with pmhx of  hlp on [id number] of primary prophylaxis, crf followed by Dr. Lee, bilateral knee replacement at [facility] 12 years ago, presents with right lower back pain without radiating, worse with golfing/tennis/ activity. Improves with rest. Pain started insiduously but worsened 6 weeks ago without inciting event. Denies weakness, numbness, bowel/bladder incontinence. Pain level is 5/10.\par \par Interventions:\par L5-S1 interlaminar PIERRE (08/31/22):\par \par s/p RIGHT L3-4 and L4-5 transforaminal epidural steroid injection 1/10/19 \par         right L3-sacral ala medial branch block 11/5/18\par         right cluneal nerve block 7/5/18\par         right L3-4, L4-5 transforaminal epidural steroid iinjection\par         s/p L3-sacral ala right MBB rfa with 50% improvement\par         improved bilatearl L3-sacral ala mbb 10/12/17\par         LESI by Dr. Echavarria 10/2017 - minimal relief\par         \par \par \par \par  [FreeTextEntry2] : 16 [FreeTextEntry7] : Patient presents with  right lower back pain. The pain has been occurring for many years but worse in the last few months. The pain frequency is constant. The character of the pain is described as _, Throbbing, Dull. The patient reports pain level at NRS:  5/10. . With activity, the pain intensity increases to  7/10. The pain increases with activity. The pain is decreased by rest. Patient reports that perfoming activities of daily living very dfificult.

## 2023-07-13 NOTE — PHYSICAL EXAM
[Normal muscle bulk without asymmetry] : normal muscle bulk without asymmetry [Spine: Flexion to ___ degrees, without pain] : spine: flexion to [unfilled] degrees, without pain [Normal] : Gait: normal [VASU Test] : positive VASU Test (Raffi's Test) [SI Provacative Testing] : positive SI Provacative Testing [] : Motor: [NL] : normal and symmetric bilaterally [de-identified] : Constitutional: Well-developed, in no acute distress\par \par Musculoskeletal:\par Lumbar Spine:   Gait: Antalgic\par 		Inspection: Normal curvature, no abnormal kyphosis or scoliosis\par 		Facet loading: pain bilaterally\par 		Palpation:\par 			Lumbar and paraspinal muscles: pain bilaterally\par 			Sacroiliac joint: no pain\par 			Greater trochanter: no pain\par 		Muscle Strength:\par 		Iliopsoas: 5/5 bilaterally\par 		Quadriceps: 5/5 bilaterally\par 		Hamstrings: 5/5 bilaterally\par 		Tibialis anterior: 5/5 bilaterally\par 		Extensor hallucis longus: 5/5 bilaterally\par \par 		Sensation: normal and equal in bilateral lower extremities\par \par Extremity: no edema noted\par Neurological: Memory normal, AAO x 3, Cranial nerves II - XII grossly normal\par Psychiatric: Appropriate mood and affect, oriented to time, place, person, and situation\par

## 2023-07-13 NOTE — ASSESSMENT
[FreeTextEntry1] : 81 yom w/ right low back pain much improved after PIERRE but pain has recently returned. Wishes for repeat PIERRE.\par \par I have personally reviewed the patient's MRI in detail and discussed it with them which is significant for a disc herniation at L5-S1 on the right.\par \par The patient has failed to have relief with medication management. The patient has failed to have relief with more then six weeks of physical therapy within the last three months. Given the patients failure to improve with all other conservative measures, recommend L5-S1 interlaminar epidural steroid injection under fluoroscopic guidance. The patient will follow-up with me in my office two weeks following intervention.\par \par I have discussed in detail with the patient that an interventional spine procedure is associated with potential risks. The procedure may include an injection of steroid and potentially other medications (local anesthetic and normal saline) into the epidural space or surrounding tissue of the spine. There are significant risks of this procedure which include and are not limited to infection, bleeding, worsening pain, dural puncture leading to post-dural puncture headache, nerve damage, spinal cord injury, paralysis, stroke, and death. There is a chance that the procedure does not improve their pain. There are risks associated with the steroid being absorbed into the body systemically. These include dysphoria, difficulty sleeping, mood swings, and personality changes. Pre-menopausal women may notice a regularity his in her menstrual cycle for 2-3 months following the injection. Steroids can specifically affect patients with hypertension, diabetes, and peptic ulcers. The procedure may cause a temporary increase in blood pressure and blood glucose, and may adversely affect a peptic ulcer. Other, more rare complications, including avascular necrosis of the joints, glaucoma, and osteoporosis. I have discussed the risks of the procedure at length with the patient, and the potential benefits of pain relief. I have offered alternatives to the procedure. All questions were answered. The patient expressed understanding and wishes to proceed with the procedure.\par \par Physical therapy prescribed - goal will be to increase ROM, strengthening, postural training, other modalities ad becky which may include massage and stim. Goals of therapy discussed with the patient in detail and will be discussed with physical therapist. Patient will follow-up following course of physical therapy to monitor progress and adjust therapy as needed.\par \par Acetaminophen 1,000 mg q8h prn for moderate pain. Risks, benefits, and alternatives of acetaminophen discussed with patient.\par \par Ibuprofen 600 mg q8h prn add when pain is not adequately controlled with acetaminophen. Risks, benefits, and alternatives of ibuprofen discussed with patient.\par

## 2023-07-17 ENCOUNTER — APPOINTMENT (OUTPATIENT)
Dept: UROLOGY | Facility: CLINIC | Age: 83
End: 2023-07-17
Payer: MEDICARE

## 2023-07-17 PROCEDURE — 99442: CPT | Mod: 95

## 2023-07-24 ENCOUNTER — APPOINTMENT (OUTPATIENT)
Dept: PAIN MANAGEMENT | Facility: CLINIC | Age: 83
End: 2023-07-24
Payer: MEDICARE

## 2023-07-24 VITALS
RESPIRATION RATE: 17 BRPM | TEMPERATURE: 98 F | HEART RATE: 73 BPM | SYSTOLIC BLOOD PRESSURE: 150 MMHG | DIASTOLIC BLOOD PRESSURE: 82 MMHG | HEIGHT: 69 IN | BODY MASS INDEX: 24.44 KG/M2 | WEIGHT: 165 LBS | OXYGEN SATURATION: 100 %

## 2023-07-24 DIAGNOSIS — M54.16 RADICULOPATHY, LUMBAR REGION: ICD-10-CM

## 2023-07-24 PROCEDURE — 62323 NJX INTERLAMINAR LMBR/SAC: CPT

## 2023-07-24 NOTE — PROCEDURE
[FreeTextEntry1] : PROCEDURE: L5-S1 Interlaminar epidural steroid injection under fluoroscopic guidance.\par \par CHIEF COMPLAINT: Low back and leg pain.  \par \par PREOPERATIVE DIAGNOSIS:	Lumbar radiculopathy.\par \par POSTOPERATIVE DIAGNOSIS:  Lumbar radiculopathy. 	\par \par ANESTHESIA:  Local.\par \par COMPLICATIONS:  	None.\par \par ESTIMATED BLOOD LOSS:  None.	\par \par INDICATIONS: Mr. Arias is a very pleasant 83 yom who has significant low back and leg pain.  The patient has failed to have relief with medication management and physical therapy. Given their failure to improve with all other conservative measures, it was determined that the patient would benefit from a L5-S1 interlaminar epidural steroid injection under fluoroscopic guidance.  \par \par The patient denies any fevers, chills, nausea, vomiting, diarrhea, constipation, chest pain, shortness of breath, or burning on urination.  They deny any latex allergy.  They are not taking any anticoagulants and do not have a history of coagulopathy.  The patient denies any IV contrast allergy.     \par \par PROCEDURE COURSE: The risks, benefits, and alternatives of the lumbar interlaminar epidural steroid injection were explained to the patient.  All questions were answered to their own satisfaction.  Written consent was signed and placed in the chart. The patient’s low back was marked in the preoperative holding area. \par \par The patient was brought to the Operating Room and placed in the prone position with a pillow under the abdomen to reduce lumbar lordosis.  A time-out was taken identifying the patient, the procedure, the site and side, and the patient’s allergies.  ASA standard monitors were applied for monitoring throughout the procedure.  The patient’s back was prepped and draped with Chloroprep in the usual sterile fashion and sterile technique was adhered to throughout the entire procedure.\par \par The lumbar spine was visualized under fluoroscopy in the AP view. The 12th rib and T12 vertebra were identified as a reference point and the lumbar vertebral bodies were counted.  The L5 vertebral body was then squared. The vertebral body and the superior and inferior end plates were aligned and the spinous processes were adjusted until midline. The L5-S1 interlaminar space was identified under fluoroscopic guidance and a caudal tilt was utilized to optimize the opening of the interlaminar space. The skin and subcutaneous tissues were infiltrated with 1% Lidocaine.  After adequate local anesthesia was obtained, a 20g Tuohy needle was inserted at L5-S1 interspace. The needle was carefully advanced, alternating between AP and lateral views, to ensure appropriate trajectory and depth. Once the ligamentum flavum was engaged, a sterile glass syringe was employed and a loss of resistance to air technique was utilized to identify the epidural space. Once obtained, negative aspiration for hem and CSF was obtained, further confirming location. Following this, 1 cc of contrast was administered and epidural spread was confirmed in the AP and lateral views. Following this, 80 mg of methylprednisolone and 2 cc of 1% lidocaine was slowly administered in incremental amounts.\par \par All injections were done with negative aspiration for cerebrospinal fluid or heme, and all needles were withdrawn without incident. I personally met with the patient following the procedure and all questions were answered. The patient tolerated the procedure well without any apparent complications and was transferred to the Recovery Room in stable condition. The patient was provided with discharge instructions and will follow-up with me in my office.\par \par 	___________________________________\par 	iFliberto Byrd M.D.\par

## 2023-08-14 ENCOUNTER — APPOINTMENT (OUTPATIENT)
Dept: PAIN MANAGEMENT | Facility: CLINIC | Age: 83
End: 2023-08-14

## 2023-08-30 ENCOUNTER — APPOINTMENT (OUTPATIENT)
Dept: UROLOGY | Facility: CLINIC | Age: 83
End: 2023-08-30
Payer: MEDICARE

## 2023-08-30 DIAGNOSIS — N18.4 CHRONIC KIDNEY DISEASE, STAGE 4 (SEVERE): ICD-10-CM

## 2023-08-30 DIAGNOSIS — N28.89 OTHER SPECIFIED DISORDERS OF KIDNEY AND URETER: ICD-10-CM

## 2023-08-30 PROCEDURE — 99442: CPT | Mod: 95

## 2023-10-18 ENCOUNTER — APPOINTMENT (OUTPATIENT)
Dept: UROLOGY | Facility: CLINIC | Age: 83
End: 2023-10-18
Payer: MEDICARE

## 2023-10-18 DIAGNOSIS — N28.89 OTHER SPECIFIED DISORDERS OF KIDNEY AND URETER: ICD-10-CM

## 2023-10-18 PROCEDURE — 99442: CPT | Mod: 95

## 2023-12-08 ENCOUNTER — RESULT REVIEW (OUTPATIENT)
Age: 83
End: 2023-12-08

## 2023-12-08 ENCOUNTER — APPOINTMENT (OUTPATIENT)
Dept: UROLOGY | Facility: HOSPITAL | Age: 83
End: 2023-12-08

## 2023-12-08 ENCOUNTER — TRANSCRIPTION ENCOUNTER (OUTPATIENT)
Age: 83
End: 2023-12-08

## 2023-12-27 ENCOUNTER — APPOINTMENT (OUTPATIENT)
Dept: UROLOGY | Facility: CLINIC | Age: 83
End: 2023-12-27
Payer: MEDICARE

## 2023-12-27 VITALS
DIASTOLIC BLOOD PRESSURE: 70 MMHG | BODY MASS INDEX: 24.44 KG/M2 | SYSTOLIC BLOOD PRESSURE: 124 MMHG | WEIGHT: 165 LBS | HEIGHT: 69 IN | HEART RATE: 80 BPM

## 2023-12-27 PROCEDURE — 99024 POSTOP FOLLOW-UP VISIT: CPT

## 2023-12-27 NOTE — ASSESSMENT
[FreeTextEntry1] : Patient is a 83-year-old male with BPH with obstruction and nocturia who had a TURP 3 weeks ago.  He is voiding well with excellent force of stream and is starting to have less frequency and urgency but is still getting up 4 times at night.  He has seen just rare intermittent hematuria but no dysuria or pain.  His postvoid residual today is 120 mL.  I discussed with him that his pathology came back as benign prostate tissue.  Assessment and plan 1.  BPH with obstruction 2.  Lower tract urinary symptoms especially nocturia He is healing appropriately from the TURP.  I explained to him the irritative voiding symptoms sometimes take a while to resolve.  He is doing well and follow-up in 1 month.

## 2023-12-28 ENCOUNTER — NON-APPOINTMENT (OUTPATIENT)
Age: 83
End: 2023-12-28

## 2024-01-17 ENCOUNTER — APPOINTMENT (OUTPATIENT)
Dept: UROLOGY | Facility: CLINIC | Age: 84
End: 2024-01-17
Payer: MEDICARE

## 2024-01-17 VITALS
DIASTOLIC BLOOD PRESSURE: 68 MMHG | WEIGHT: 165 LBS | HEIGHT: 69 IN | SYSTOLIC BLOOD PRESSURE: 123 MMHG | HEART RATE: 77 BPM | BODY MASS INDEX: 24.44 KG/M2

## 2024-01-17 PROCEDURE — 99024 POSTOP FOLLOW-UP VISIT: CPT

## 2024-01-18 NOTE — ASSESSMENT
[FreeTextEntry1] : well in no patient is a 83-year-old male who is here for follow-up status post TURP for BPH with obstruction about 6 to 8 weeks ago.  He is having no blood or burning at all and his force of stream is considerably better.  His nocturia is down to 2-3 times a night from 4-5.  He still has some slight urgency but overall he is slowly improving.  Assessment plan 1.  BPH with obstruction status post TURP 2 months ago His symptoms are improving appropriately and he will follow-up in 2 months for a UA and PVR.

## 2024-04-18 ENCOUNTER — APPOINTMENT (OUTPATIENT)
Dept: UROLOGY | Facility: CLINIC | Age: 84
End: 2024-04-18
Payer: MEDICARE

## 2024-04-18 VITALS
HEART RATE: 93 BPM | DIASTOLIC BLOOD PRESSURE: 74 MMHG | WEIGHT: 165 LBS | HEIGHT: 69 IN | SYSTOLIC BLOOD PRESSURE: 153 MMHG | BODY MASS INDEX: 24.44 KG/M2

## 2024-04-18 PROCEDURE — 99213 OFFICE O/P EST LOW 20 MIN: CPT

## 2024-04-18 RX ORDER — VIBEGRON 75 MG/1
75 TABLET, FILM COATED ORAL
Qty: 30 | Refills: 1 | Status: ACTIVE | COMMUNITY
Start: 2024-04-18 | End: 1900-01-01

## 2024-04-18 NOTE — ASSESSMENT
[FreeTextEntry1] : Patient is a 83-year-old male status post transurethral resection of prostate 4 months ago for decreased force of stream BPH and nocturia.  He has had increased force of stream but continues to have nocturia 3-4 times at night.  He denies any gross hematuria, dysuria or pelvic pain.  He voided 2 hours ago and his postvoid residual today was 100 mL.  Assessment and plan 1.  BPH with obstruction especially nocturia I discussed the fact why the nocturia may be persistent and would like to try Gemtesa 75 mg p.o. daily.  I discussed the side effects he understands and wishes to proceed.  He will follow-up in 1 month.
Wounds

## 2024-05-16 ENCOUNTER — APPOINTMENT (OUTPATIENT)
Dept: UROLOGY | Facility: CLINIC | Age: 84
End: 2024-05-16
Payer: MEDICARE

## 2024-05-16 VITALS
DIASTOLIC BLOOD PRESSURE: 71 MMHG | HEART RATE: 69 BPM | HEIGHT: 69 IN | SYSTOLIC BLOOD PRESSURE: 142 MMHG | BODY MASS INDEX: 24.44 KG/M2 | WEIGHT: 165 LBS

## 2024-05-16 DIAGNOSIS — N40.1 BENIGN PROSTATIC HYPERPLASIA WITH LOWER URINARY TRACT SYMPMS: ICD-10-CM

## 2024-05-16 DIAGNOSIS — N32.81 OVERACTIVE BLADDER: ICD-10-CM

## 2024-05-16 DIAGNOSIS — Z87.898 PERSONAL HISTORY OF OTHER SPECIFIED CONDITIONS: ICD-10-CM

## 2024-05-16 DIAGNOSIS — R35.1 NOCTURIA: ICD-10-CM

## 2024-05-16 PROCEDURE — 99213 OFFICE O/P EST LOW 20 MIN: CPT

## 2024-05-16 NOTE — ASSESSMENT
[FreeTextEntry1] : Patient is a 83-year-old male status post transurethral resection of prostate 5 months ago for decreased force of stream BPH and nocturia. He has had increased force of stream but continues to have nocturia 3-4 times at night. He denies any gross hematuria, dysuria or pelvic pain.  He started Gemtesa 75 mg daily with no real benefit in his nocturia.  Assessment and plan 1. BPH with obstruction especially nocturia Gemtesa was not helpful and we will hold.  I would like to do a voiding diary to assess the volume of urine being made at night.  He will follow-up in 2 months to go over the voiding diary and also for his annual digital rectal exam.

## 2024-05-21 ENCOUNTER — APPOINTMENT (OUTPATIENT)
Dept: UROLOGY | Facility: CLINIC | Age: 84
End: 2024-05-21

## 2024-07-18 ENCOUNTER — APPOINTMENT (OUTPATIENT)
Dept: UROLOGY | Facility: CLINIC | Age: 84
End: 2024-07-18

## 2024-07-29 ENCOUNTER — APPOINTMENT (OUTPATIENT)
Dept: FAMILY MEDICINE | Facility: CLINIC | Age: 84
End: 2024-07-29

## 2024-08-01 ENCOUNTER — RESULT REVIEW (OUTPATIENT)
Age: 84
End: 2024-08-01

## 2024-08-02 ENCOUNTER — TRANSCRIPTION ENCOUNTER (OUTPATIENT)
Age: 84
End: 2024-08-02

## 2024-08-08 PROBLEM — R06.00 DYSPNEA: Status: ACTIVE | Noted: 2024-08-08

## 2024-08-08 PROBLEM — I25.10 CAD (CORONARY ARTERY DISEASE): Status: ACTIVE | Noted: 2024-08-08

## 2024-08-15 ENCOUNTER — APPOINTMENT (OUTPATIENT)
Dept: PULMONOLOGY | Facility: CLINIC | Age: 84
End: 2024-08-15
Payer: MEDICARE

## 2024-08-15 VITALS
TEMPERATURE: 98 F | HEIGHT: 69 IN | OXYGEN SATURATION: 99 % | WEIGHT: 165 LBS | BODY MASS INDEX: 24.44 KG/M2 | SYSTOLIC BLOOD PRESSURE: 133 MMHG | DIASTOLIC BLOOD PRESSURE: 54 MMHG | HEART RATE: 79 BPM

## 2024-08-15 DIAGNOSIS — R91.1 SOLITARY PULMONARY NODULE: ICD-10-CM

## 2024-08-15 DIAGNOSIS — J44.9 CHRONIC OBSTRUCTIVE PULMONARY DISEASE, UNSPECIFIED: ICD-10-CM

## 2024-08-15 DIAGNOSIS — J18.9 PNEUMONIA, UNSPECIFIED ORGANISM: ICD-10-CM

## 2024-08-15 PROCEDURE — 99215 OFFICE O/P EST HI 40 MIN: CPT

## 2024-08-15 RX ORDER — UMECLIDINIUM BROMIDE AND VILANTEROL TRIFENATATE 62.5; 25 UG/1; UG/1
62.5-25 POWDER RESPIRATORY (INHALATION)
Qty: 1 | Refills: 3 | Status: ACTIVE | COMMUNITY
Start: 2024-08-15 | End: 1900-01-01

## 2024-08-15 NOTE — PHYSICAL EXAM
[No Acute Distress] : no acute distress [Normal Appearance] : normal appearance [Normal S1, S2] : normal s1, s2 [No Resp Distress] : no resp distress [Clear to Auscultation Bilaterally] : clear to auscultation bilaterally [No Abnormalities] : no abnormalities [No Clubbing] : no clubbing [1+ Pitting] : 1+ pitting [No Focal Deficits] : no focal deficits [Oriented x3] : oriented x3 [Normal Affect] : normal affect

## 2024-08-15 NOTE — REASON FOR VISIT
[Follow-Up - From Hospitalization] : a follow-up visit after a recent hospitalization [Pneumonia] : pneumonia [COPD] : COPD [Pulmonary Nodules] : pulmonary nodules

## 2024-08-15 NOTE — HISTORY OF PRESENT ILLNESS
Released to [facility] Dr. Mayuri Nichols,     MRI of Sacroiliac joints did not show evidence of ankylosing spondylitis. 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 Please consider PT if you are symptomatic.      Please let me know if you have
[TextBox_4] : 84 year old male with COPD, recently admitted to [place] with pneumonia He was on ABX and started on Anoro D/c summary reviewed. CT abdomen reviewed my read: in 2021 he had a 1 cm RtLL nodule, solid, smooth margins. This nodule was unchanged from a previous CT abdomen also seen in 2019 Current CT chest with b/l basal infiltrates that obscured the RtLL nodule Has 60 pack year smoking, quit 2 years ago Feels better. Dyspnea is back to baseline. he is able to climb stairs and plays golf. Has more dyspnea on exertion when the weather is humid On Anoro No cough, no hemoptysis D/w him about CT results

## 2024-09-23 ENCOUNTER — APPOINTMENT (OUTPATIENT)
Dept: PULMONOLOGY | Facility: CLINIC | Age: 84
End: 2024-09-23
Payer: MEDICARE

## 2024-09-23 PROCEDURE — 94618 PULMONARY STRESS TESTING: CPT

## 2024-09-23 PROCEDURE — 94727 GAS DIL/WSHOT DETER LNG VOL: CPT

## 2024-09-23 PROCEDURE — 94060 EVALUATION OF WHEEZING: CPT

## 2024-09-23 PROCEDURE — 94729 DIFFUSING CAPACITY: CPT

## 2024-10-03 ENCOUNTER — APPOINTMENT (OUTPATIENT)
Dept: PAIN MANAGEMENT | Facility: CLINIC | Age: 84
End: 2024-10-03
Payer: MEDICARE

## 2024-10-03 ENCOUNTER — RESULT REVIEW (OUTPATIENT)
Age: 84
End: 2024-10-03

## 2024-10-03 VITALS
DIASTOLIC BLOOD PRESSURE: 80 MMHG | BODY MASS INDEX: 24.44 KG/M2 | HEIGHT: 69 IN | WEIGHT: 165 LBS | SYSTOLIC BLOOD PRESSURE: 170 MMHG

## 2024-10-03 DIAGNOSIS — M47.816 SPONDYLOSIS W/OUT MYELOPATHY OR RADICULOPATHY, LUMBAR REGION: ICD-10-CM

## 2024-10-03 DIAGNOSIS — M47.817 SPONDYLOSIS W/OUT MYELOPATHY OR RADICULOPATHY, LUMBOSACRAL REGION: ICD-10-CM

## 2024-10-03 DIAGNOSIS — M48.061 SPINAL STENOSIS, LUMBAR REGION WITHOUT NEUROGENIC CLAUDICATION: ICD-10-CM

## 2024-10-03 DIAGNOSIS — M54.16 RADICULOPATHY, LUMBAR REGION: ICD-10-CM

## 2024-10-03 PROCEDURE — 99214 OFFICE O/P EST MOD 30 MIN: CPT

## 2024-10-03 NOTE — ASSESSMENT
[FreeTextEntry1] : 84 yom w/ right low back pain much improved after PIERRE but pain has recently returned. Wishes for repeat PIERRE.  I have personally reviewed the patient's MRI in detail and discussed it with them which is significant for a disc herniation at L5-S1 on the right.  The patient has failed to have relief with medication management. The patient has failed to have relief with more then six weeks of physical therapy within the last three months. Given the patients failure to improve with all other conservative measures, recommend L5-S1 interlaminar epidural steroid injection under fluoroscopic guidance. The patient will follow-up with me in my office two weeks following intervention.  I have discussed in detail with the patient that an interventional spine procedure is associated with potential risks. The procedure may include an injection of steroid and potentially other medications (local anesthetic and normal saline) into the epidural space or surrounding tissue of the spine. There are significant risks of this procedure which include and are not limited to infection, bleeding, worsening pain, dural puncture leading to post-dural puncture headache, nerve damage, spinal cord injury, paralysis, stroke, and death. There is a chance that the procedure does not improve their pain. There are risks associated with the steroid being absorbed into the body systemically. These include dysphoria, difficulty sleeping, mood swings, and personality changes. Pre-menopausal women may notice a regularity his in her menstrual cycle for 2-3 months following the injection. Steroids can specifically affect patients with hypertension, diabetes, and peptic ulcers. The procedure may cause a temporary increase in blood pressure and blood glucose, and may adversely affect a peptic ulcer. Other, more rare complications, including avascular necrosis of the joints, glaucoma, and osteoporosis. I have discussed the risks of the procedure at length with the patient, and the potential benefits of pain relief. I have offered alternatives to the procedure. All questions were answered. The patient expressed understanding and wishes to proceed with the procedure.  Physical therapy prescribed - goal will be to increase ROM, strengthening, postural training, other modalities ad becky which may include massage and stim. Goals of therapy discussed with the patient in detail and will be discussed with physical therapist. Patient will follow-up following course of physical therapy to monitor progress and adjust therapy as needed.  Acetaminophen 1,000 mg q8h prn for moderate pain. Risks, benefits, and alternatives of acetaminophen discussed with patient.  Ibuprofen 600 mg q8h prn add when pain is not adequately controlled with acetaminophen. Risks, benefits, and alternatives of ibuprofen discussed with patient.  If no relief plan for right SIJ.

## 2024-10-03 NOTE — HISTORY OF PRESENT ILLNESS
[Back Pain] : back pain [9] : a maximum pain level of 9/10 [Aching] : aching [Transitioning] : transitioning [Bending] : bending [Rest] : rest [5] : 2. What number best describes how, during the past week, pain has interfered with your enjoyment of life? 5/10 pain [6] : 3. What number best describes how, during the past week, pain has interfered with your general activity? 6/10 pain [FreeTextEntry1] : Interval Note: His pain improved dramatically after previous PIERRE but pain has returned. He was recently in the hospital for pneumonia but is now clear of infection. Quality of life is impaired. There has been a severe exacerbation of the patient's chronic pain. Wishes for further intervention.   He has seen Dr. Thorne last three years ago. Since then he has seen a neurosurgeon who recommended SIJ injection. After that he had two SIJ RFA w/ Dr. Bonilla in [place] with relief after the first ablation but not the second. Pain is localized to the right low back area. No radiation down the leg. Pain is worse with golfing. As her Dr. Thorne's notes he was previously, "s/p RIGHT L3-4 and L4-5 transforaminal epidural steroid injection 1/10/19 with 50% improvement in back pain and leg pain.  Continues to complain of right buttock pain when ambulating.  Patient saw Dr. Fredy MCKNIGHT who stated that he may benefit from SIJ steroid injection.  States that it is worse with bending, twisting and turning.  Denies any additional weakness, numbness, bowel/bladder dysfunction.  Pain intensity is 5/10  Mr. JOSE MCARTHUR is a 78 year M with pmhx of  hlp on [id number] of primary prophylaxis, crf followed by Dr. Lee, bilateral knee replacement at [facility] 12 years ago, presents with right lower back pain without radiating, worse with golfing/tennis/ activity. Improves with rest. Pain started insiduously but worsened 6 weeks ago without inciting event. Denies weakness, numbness, bowel/bladder incontinence. Pain level is 5/10.  Interventions: L5-S1 interlaminar PIERRE (07/24/23): L5-S1 interlaminar PIERRE (08/31/22):  s/p RIGHT L3-4 and L4-5 transforaminal epidural steroid injection 1/10/19          right L3-sacral ala medial branch block 11/5/18         right cluneal nerve block 7/5/18         right L3-4, L4-5 transforaminal epidural steroid iinjection         s/p L3-sacral ala right MBB rfa with 50% improvement         improved bilatearl L3-sacral ala mbb 10/12/17         LESI by Dr. Echavarria 10/2017 - minimal relief              [FreeTextEntry7] : Patient presents with  right lower back pain. The pain has been occurring for many years but worse in the last few months. The pain frequency is constant. The character of the pain is described as _, Throbbing, Dull. The patient reports pain level at NRS:  5/10. . With activity, the pain intensity increases to  7/10. The pain increases with activity. The pain is decreased by rest. Patient reports that perfoming activities of daily living very dfificult.  [FreeTextEntry2] : 16

## 2024-10-03 NOTE — DATA REVIEWED
[FreeTextEntry1] : MRI LUMBAR SPINE (08/06/22):   There appear to be 5 nonrib-bearing lumbar type vertebral bodies. For purposes of this report, vertebral body at level of the iliolumbar ligament will be designated as L5. Inferiormost well-form ed intervertebral disc space will be designated as L5-S1. No MR evidence for transitional lumbosacral anatomy.   Lumbar lordosis is maintained. Mild L4-L5 anterolisthesis, L2-L3 retrolisthesis, L1-2 retrolisthesis. Vertebral body heights are maintained. Vertebral body bone marrow signal within n ormal limits. No abnormal cord signal identified. Conus terminates at L2. No significant periarticular or paraspinal soft tissue edema is identified. There are multilevel degenerative endplate changes with osteophyte formation, intervertebral disc space narrowing/desiccation, Schmorl's nodes and endplate irregularity. No suspicious expansile or destructive osseous lesion identified. Paraspinal soft tissues are unremarkable. There are partially imaged T2 hyperintense lesions within the kidneys, nonspecific incompletely characterized may reflect benign cystic lesions.     There are multilevel findings as follows:   T12-L1: No significant canal or foraminal stenosis.   L1-L2: No significant canal or foraminal stenosis.   L2-L3: Disc bulge, bilateral facet arthropathy, ligamentous hypertrophy contributing to no significant canal stenosis and mild bilateral foraminal stenosis.  L3-L4: Disc bulge, bilateral facet arthropathy, ligamentous hypertrophy contributing to no significant canal stenosis and mild bilateral foraminal stenosis. Moderate bilateral lateral recess stenosis involving descending L4 nerve roots.  L4-L5: Disc bulge, bilateral facet arthropathy, ligamentous hypertrophy contributing to no significant canal stenosis and mild bilateral foraminal stenosis. Moderate bilateral lateral recess stenosis involving descending L5 nerve roots.  L5-S1: Disc bulge superimposed right paracentral disc protrusion, bilateral facet arthropathy, ligamentous hypertrophy contributing to no significant canal stenosis and mild bilateral foraminal stenosis. Mild to moderate bilateral lateral recess stenosis involving descending S1 nerve roots.      IMPRESSION:   Multilevel lumbar spondylitic changes as detailed above notable for mild bilateral foraminal stenosis at L2-L3, L3-L4, L4-L5 and L5-S1 involving exiting L2, L3, L4, L5 nerve roots, respectively and moderate bilateral lateral recess stenosis at L3-L4, L4-L5 involving descending L4, L5 nerve roots, respectively. No significant canal stenosis of the lumbar spine.

## 2024-10-03 NOTE — PHYSICAL EXAM
[Normal muscle bulk without asymmetry] : normal muscle bulk without asymmetry [Spine: Flexion to ___ degrees, without pain] : spine: flexion to [unfilled] degrees, without pain [Normal] : Gait: normal [VASU Test] : positive VASU Test (Raffi's Test) [SI Provacative Testing] : positive SI Provacative Testing [] : Motor: [NL] : normal and symmetric bilaterally [de-identified] : Constitutional: Well-developed, in no acute distress\par  \par  Musculoskeletal:\par  Lumbar Spine:   Gait: Antalgic\par  		Inspection: Normal curvature, no abnormal kyphosis or scoliosis\par  		Facet loading: pain bilaterally\par  		Palpation:\par  			Lumbar and paraspinal muscles: pain bilaterally\par  			Sacroiliac joint: no pain\par  			Greater trochanter: no pain\par  		Muscle Strength:\par  		Iliopsoas: 5/5 bilaterally\par  		Quadriceps: 5/5 bilaterally\par  		Hamstrings: 5/5 bilaterally\par  		Tibialis anterior: 5/5 bilaterally\par  		Extensor hallucis longus: 5/5 bilaterally\par  \par  		Sensation: normal and equal in bilateral lower extremities\par  \par  Extremity: no edema noted\par  Neurological: Memory normal, AAO x 3, Cranial nerves II - XII grossly normal\par  Psychiatric: Appropriate mood and affect, oriented to time, place, person, and situation\par

## 2024-10-17 ENCOUNTER — APPOINTMENT (OUTPATIENT)
Dept: PULMONOLOGY | Facility: CLINIC | Age: 84
End: 2024-10-17
Payer: MEDICARE

## 2024-10-17 VITALS
DIASTOLIC BLOOD PRESSURE: 64 MMHG | HEIGHT: 69 IN | OXYGEN SATURATION: 99 % | TEMPERATURE: 98 F | HEART RATE: 66 BPM | WEIGHT: 165 LBS | SYSTOLIC BLOOD PRESSURE: 158 MMHG | BODY MASS INDEX: 24.44 KG/M2

## 2024-10-17 DIAGNOSIS — J44.9 CHRONIC OBSTRUCTIVE PULMONARY DISEASE, UNSPECIFIED: ICD-10-CM

## 2024-10-17 DIAGNOSIS — R91.8 OTHER NONSPECIFIC ABNORMAL FINDING OF LUNG FIELD: ICD-10-CM

## 2024-10-17 PROCEDURE — 99215 OFFICE O/P EST HI 40 MIN: CPT

## 2024-10-17 PROCEDURE — G2211 COMPLEX E/M VISIT ADD ON: CPT

## 2024-10-18 ENCOUNTER — RESULT REVIEW (OUTPATIENT)
Age: 84
End: 2024-10-18

## 2024-10-18 ENCOUNTER — NON-APPOINTMENT (OUTPATIENT)
Age: 84
End: 2024-10-18

## 2024-10-18 DIAGNOSIS — I51.9 HEART DISEASE, UNSPECIFIED: ICD-10-CM

## 2024-10-21 ENCOUNTER — APPOINTMENT (OUTPATIENT)
Dept: PAIN MANAGEMENT | Facility: CLINIC | Age: 84
End: 2024-10-21
Payer: MEDICARE

## 2024-10-21 VITALS
OXYGEN SATURATION: 98 % | RESPIRATION RATE: 16 BRPM | SYSTOLIC BLOOD PRESSURE: 153 MMHG | DIASTOLIC BLOOD PRESSURE: 71 MMHG | HEART RATE: 70 BPM

## 2024-10-21 DIAGNOSIS — M54.16 RADICULOPATHY, LUMBAR REGION: ICD-10-CM

## 2024-10-21 PROCEDURE — 62323 NJX INTERLAMINAR LMBR/SAC: CPT

## 2024-11-04 ENCOUNTER — APPOINTMENT (OUTPATIENT)
Dept: PAIN MANAGEMENT | Facility: CLINIC | Age: 84
End: 2024-11-04
Payer: MEDICARE

## 2024-11-04 VITALS
WEIGHT: 165 LBS | BODY MASS INDEX: 24.44 KG/M2 | HEIGHT: 69 IN | SYSTOLIC BLOOD PRESSURE: 130 MMHG | DIASTOLIC BLOOD PRESSURE: 70 MMHG

## 2024-11-04 DIAGNOSIS — M47.816 SPONDYLOSIS W/OUT MYELOPATHY OR RADICULOPATHY, LUMBAR REGION: ICD-10-CM

## 2024-11-04 DIAGNOSIS — M54.16 RADICULOPATHY, LUMBAR REGION: ICD-10-CM

## 2024-11-04 DIAGNOSIS — G89.4 CHRONIC PAIN SYNDROME: ICD-10-CM

## 2024-11-04 PROCEDURE — 99214 OFFICE O/P EST MOD 30 MIN: CPT

## 2024-11-04 PROCEDURE — G2211 COMPLEX E/M VISIT ADD ON: CPT

## 2025-01-11 ENCOUNTER — TRANSCRIPTION ENCOUNTER (OUTPATIENT)
Age: 85
End: 2025-01-11

## 2025-01-13 ENCOUNTER — APPOINTMENT (OUTPATIENT)
Dept: CARDIOLOGY | Facility: CLINIC | Age: 85
End: 2025-01-13

## 2025-01-21 ENCOUNTER — APPOINTMENT (OUTPATIENT)
Dept: CARDIOLOGY | Facility: CLINIC | Age: 85
End: 2025-01-21

## 2025-01-27 ENCOUNTER — RX RENEWAL (OUTPATIENT)
Age: 85
End: 2025-01-27

## 2025-01-30 ENCOUNTER — APPOINTMENT (OUTPATIENT)
Dept: PULMONOLOGY | Facility: CLINIC | Age: 85
End: 2025-01-30

## 2025-03-12 NOTE — PRE-OP CHECKLIST - AS BP NONINV SITE
[facility] 360 being delayed due to retesting. New expected date of result reporting 3/20.   left upper arm

## 2025-04-18 ENCOUNTER — APPOINTMENT (OUTPATIENT)
Dept: NEPHROLOGY | Facility: CLINIC | Age: 85
End: 2025-04-18

## 2025-04-24 ENCOUNTER — TRANSCRIPTION ENCOUNTER (OUTPATIENT)
Age: 85
End: 2025-04-24

## 2025-04-29 ENCOUNTER — NON-APPOINTMENT (OUTPATIENT)
Age: 85
End: 2025-04-29

## 2025-05-01 ENCOUNTER — APPOINTMENT (OUTPATIENT)
Dept: NEPHROLOGY | Facility: CLINIC | Age: 85
End: 2025-05-01

## 2025-05-05 ENCOUNTER — NON-APPOINTMENT (OUTPATIENT)
Age: 85
End: 2025-05-05

## 2025-05-08 ENCOUNTER — TRANSCRIPTION ENCOUNTER (OUTPATIENT)
Age: 85
End: 2025-05-08

## 2025-05-13 ENCOUNTER — RESULT REVIEW (OUTPATIENT)
Age: 85
End: 2025-05-13

## 2025-05-13 ENCOUNTER — APPOINTMENT (OUTPATIENT)
Dept: NEPHROLOGY | Facility: CLINIC | Age: 85
End: 2025-05-13
Payer: MEDICARE

## 2025-05-13 DIAGNOSIS — I10 ESSENTIAL (PRIMARY) HYPERTENSION: ICD-10-CM

## 2025-05-13 DIAGNOSIS — N28.89 OTHER SPECIFIED DISORDERS OF KIDNEY AND URETER: ICD-10-CM

## 2025-05-13 DIAGNOSIS — N18.4 CHRONIC KIDNEY DISEASE, STAGE 4 (SEVERE): ICD-10-CM

## 2025-05-13 PROCEDURE — 36415 COLL VENOUS BLD VENIPUNCTURE: CPT

## 2025-05-16 ENCOUNTER — RESULT REVIEW (OUTPATIENT)
Age: 85
End: 2025-05-16

## 2025-05-16 ENCOUNTER — APPOINTMENT (OUTPATIENT)
Dept: ORTHOPEDIC SURGERY | Facility: CLINIC | Age: 85
End: 2025-05-16
Payer: MEDICARE

## 2025-05-16 VITALS — BODY MASS INDEX: 24.44 KG/M2 | HEIGHT: 69 IN | WEIGHT: 165 LBS

## 2025-05-16 DIAGNOSIS — S62.102D FRACTURE OF UNSPECIFIED CARPAL BONE, LEFT WRIST, SUBSEQUENT ENCOUNTER FOR FRACTURE WITH ROUTINE HEALING: ICD-10-CM

## 2025-05-16 PROCEDURE — 99024 POSTOP FOLLOW-UP VISIT: CPT

## 2025-05-22 ENCOUNTER — RESULT REVIEW (OUTPATIENT)
Age: 85
End: 2025-05-22

## 2025-05-22 ENCOUNTER — APPOINTMENT (OUTPATIENT)
Dept: NEPHROLOGY | Facility: CLINIC | Age: 85
End: 2025-05-22

## 2025-05-22 VITALS
BODY MASS INDEX: 24.44 KG/M2 | DIASTOLIC BLOOD PRESSURE: 72 MMHG | OXYGEN SATURATION: 99 % | HEART RATE: 69 BPM | WEIGHT: 165 LBS | HEIGHT: 69 IN | SYSTOLIC BLOOD PRESSURE: 140 MMHG

## 2025-05-22 DIAGNOSIS — I10 ESSENTIAL (PRIMARY) HYPERTENSION: ICD-10-CM

## 2025-05-22 DIAGNOSIS — D63.1 CHRONIC KIDNEY DISEASE, UNSPECIFIED: ICD-10-CM

## 2025-05-22 DIAGNOSIS — N18.9 CHRONIC KIDNEY DISEASE, UNSPECIFIED: ICD-10-CM

## 2025-05-22 DIAGNOSIS — E87.5 HYPERKALEMIA: ICD-10-CM

## 2025-05-22 PROCEDURE — 96372 THER/PROPH/DIAG INJ SC/IM: CPT

## 2025-05-22 PROCEDURE — 99215 OFFICE O/P EST HI 40 MIN: CPT | Mod: 25

## 2025-05-22 RX ORDER — EPOETIN ALFA-EPBX 4000 [IU]/ML
4000 INJECTION, SOLUTION INTRAVENOUS; SUBCUTANEOUS
Refills: 0 | Status: COMPLETED | OUTPATIENT
Start: 2025-05-22

## 2025-05-22 RX ADMIN — EPOETIN ALFA-EPBX 0 UNIT/ML: 4000 INJECTION, SOLUTION INTRAVENOUS; SUBCUTANEOUS at 00:00

## 2025-05-23 PROBLEM — E87.5 CHRONIC HYPERKALEMIA: Status: ACTIVE | Noted: 2025-05-23

## 2025-05-23 PROBLEM — I12.0 CKD STAGE 5 SECONDARY TO HYPERTENSION: Status: ACTIVE | Noted: 2025-05-22

## 2025-05-23 RX ORDER — SODIUM ZIRCONIUM CYCLOSILICATE 10 G/10G
10 POWDER, FOR SUSPENSION ORAL TWICE DAILY
Qty: 2 | Refills: 3 | Status: ACTIVE | COMMUNITY
Start: 1900-01-01 | End: 1900-01-01

## 2025-05-23 RX ORDER — EPOETIN ALFA-EPBX 4000 [IU]/ML
4000 INJECTION, SOLUTION INTRAVENOUS; SUBCUTANEOUS
Qty: 4 | Refills: 1 | Status: ACTIVE | COMMUNITY
Start: 2025-05-13

## 2025-05-23 RX ORDER — EPOETIN ALFA-EPBX 4000 [IU]/ML
4000 INJECTION, SOLUTION INTRAVENOUS; SUBCUTANEOUS
Qty: 4 | Refills: 1 | Status: DISCONTINUED | COMMUNITY
Start: 2025-05-13 | End: 2025-05-13

## 2025-05-29 ENCOUNTER — RESULT REVIEW (OUTPATIENT)
Age: 85
End: 2025-05-29

## 2025-05-29 ENCOUNTER — APPOINTMENT (OUTPATIENT)
Dept: NEPHROLOGY | Facility: CLINIC | Age: 85
End: 2025-05-29
Payer: MEDICARE

## 2025-05-29 DIAGNOSIS — N28.89 OTHER SPECIFIED DISORDERS OF KIDNEY AND URETER: ICD-10-CM

## 2025-05-29 DIAGNOSIS — N18.5 HYPERTENSIVE CHRONIC KIDNEY DISEASE WITH STAGE 5 CHRONIC KIDNEY DISEASE OR END STAGE RENAL DISEASE: ICD-10-CM

## 2025-05-29 DIAGNOSIS — I12.0 HYPERTENSIVE CHRONIC KIDNEY DISEASE WITH STAGE 5 CHRONIC KIDNEY DISEASE OR END STAGE RENAL DISEASE: ICD-10-CM

## 2025-05-29 PROBLEM — N18.9 ANEMIA ASSOCIATED WITH CHRONIC RENAL FAILURE: Status: ACTIVE | Noted: 2025-05-29

## 2025-05-29 PROCEDURE — 36415 COLL VENOUS BLD VENIPUNCTURE: CPT

## 2025-06-06 ENCOUNTER — APPOINTMENT (OUTPATIENT)
Dept: ORTHOPEDIC SURGERY | Facility: CLINIC | Age: 85
End: 2025-06-06
Payer: MEDICARE

## 2025-06-06 ENCOUNTER — RESULT REVIEW (OUTPATIENT)
Age: 85
End: 2025-06-06

## 2025-06-06 VITALS — BODY MASS INDEX: 24.44 KG/M2 | HEIGHT: 69 IN | WEIGHT: 165 LBS

## 2025-06-06 PROCEDURE — 99024 POSTOP FOLLOW-UP VISIT: CPT

## 2025-06-19 ENCOUNTER — APPOINTMENT (OUTPATIENT)
Dept: NEPHROLOGY | Facility: CLINIC | Age: 85
End: 2025-06-19
Payer: MEDICARE

## 2025-06-19 VITALS
OXYGEN SATURATION: 95 % | WEIGHT: 174 LBS | BODY MASS INDEX: 25.77 KG/M2 | SYSTOLIC BLOOD PRESSURE: 142 MMHG | DIASTOLIC BLOOD PRESSURE: 66 MMHG | HEIGHT: 69 IN | HEART RATE: 75 BPM

## 2025-06-19 PROCEDURE — 36415 COLL VENOUS BLD VENIPUNCTURE: CPT

## 2025-06-23 ENCOUNTER — RESULT REVIEW (OUTPATIENT)
Age: 85
End: 2025-06-23

## 2025-06-25 LAB
ANION GAP SERPL CALC-SCNC: 19 MMOL/L
BASOPHILS # BLD AUTO: 0.07 K/UL
BASOPHILS NFR BLD AUTO: 0.8 %
BUN SERPL-MCNC: 55 MG/DL
CALCIUM SERPL-MCNC: 8.8 MG/DL
CHLORIDE SERPL-SCNC: 110 MMOL/L
CO2 SERPL-SCNC: 16 MMOL/L
CREAT SERPL-MCNC: 5.38 MG/DL
EGFRCR SERPLBLD CKD-EPI 2021: 10 ML/MIN/1.73M2
EOSINOPHIL # BLD AUTO: 0.25 K/UL
EOSINOPHIL NFR BLD AUTO: 2.7 %
FERRITIN SERPL-MCNC: 312 NG/ML
GLUCOSE SERPL-MCNC: 137 MG/DL
HCT VFR BLD CALC: 31.7 %
HGB BLD-MCNC: 9.8 G/DL
IMM GRANULOCYTES NFR BLD AUTO: 0.3 %
IRON SATN MFR SERPL: 31 %
IRON SERPL-MCNC: 66 UG/DL
LYMPHOCYTES # BLD AUTO: 1.14 K/UL
LYMPHOCYTES NFR BLD AUTO: 12.4 %
MAN DIFF?: NORMAL
MCHC RBC-ENTMCNC: 28.1 PG
MCHC RBC-ENTMCNC: 30.9 G/DL
MCV RBC AUTO: 90.8 FL
MONOCYTES # BLD AUTO: 0.7 K/UL
MONOCYTES NFR BLD AUTO: 7.6 %
NEUTROPHILS # BLD AUTO: 7 K/UL
NEUTROPHILS NFR BLD AUTO: 76.2 %
PLATELET # BLD AUTO: 222 K/UL
POTASSIUM SERPL-SCNC: 3.6 MMOL/L
RBC # BLD: 3.49 M/UL
RBC # FLD: 15.3 %
SODIUM SERPL-SCNC: 145 MMOL/L
TIBC SERPL-MCNC: 216 UG/DL
UIBC SERPL-MCNC: 150 UG/DL
WBC # FLD AUTO: 9.19 K/UL

## 2025-06-27 ENCOUNTER — APPOINTMENT (OUTPATIENT)
Dept: NEPHROLOGY | Facility: CLINIC | Age: 85
End: 2025-06-27
Payer: MEDICARE

## 2025-06-27 VITALS
HEIGHT: 69 IN | DIASTOLIC BLOOD PRESSURE: 70 MMHG | SYSTOLIC BLOOD PRESSURE: 122 MMHG | BODY MASS INDEX: 25.33 KG/M2 | OXYGEN SATURATION: 95 % | HEART RATE: 72 BPM | WEIGHT: 171 LBS

## 2025-06-27 PROBLEM — R60.0 PERIPHERAL EDEMA: Status: ACTIVE | Noted: 2025-06-27

## 2025-06-27 PROCEDURE — 96372 THER/PROPH/DIAG INJ SC/IM: CPT

## 2025-06-27 PROCEDURE — 99214 OFFICE O/P EST MOD 30 MIN: CPT | Mod: 25

## 2025-06-27 RX ORDER — EPOETIN ALFA-EPBX 4000 [IU]/ML
4000 INJECTION, SOLUTION INTRAVENOUS; SUBCUTANEOUS
Refills: 0 | Status: COMPLETED | OUTPATIENT
Start: 2025-06-27

## 2025-06-27 RX ORDER — FUROSEMIDE 20 MG/1
20 TABLET ORAL
Qty: 90 | Refills: 1 | Status: ACTIVE | COMMUNITY
Start: 2025-06-27 | End: 1900-01-01

## 2025-06-27 RX ADMIN — EPOETIN ALFA-EPBX 0 UNIT/ML: 4000 INJECTION, SOLUTION INTRAVENOUS; SUBCUTANEOUS at 00:00

## 2025-07-11 ENCOUNTER — APPOINTMENT (OUTPATIENT)
Dept: NEPHROLOGY | Facility: CLINIC | Age: 85
End: 2025-07-11
Payer: MEDICARE

## 2025-07-11 VITALS
OXYGEN SATURATION: 96 % | SYSTOLIC BLOOD PRESSURE: 142 MMHG | DIASTOLIC BLOOD PRESSURE: 64 MMHG | WEIGHT: 166 LBS | BODY MASS INDEX: 24.59 KG/M2 | HEART RATE: 60 BPM | HEIGHT: 69 IN

## 2025-07-11 PROCEDURE — 96372 THER/PROPH/DIAG INJ SC/IM: CPT

## 2025-07-11 RX ORDER — EPOETIN ALFA-EPBX 4000 [IU]/ML
4000 INJECTION, SOLUTION INTRAVENOUS; SUBCUTANEOUS
Refills: 0 | Status: COMPLETED | OUTPATIENT
Start: 2025-07-11

## 2025-07-11 RX ADMIN — EPOETIN ALFA-EPBX 0 UNIT/ML: 4000 INJECTION, SOLUTION INTRAVENOUS; SUBCUTANEOUS at 00:00

## 2025-07-18 ENCOUNTER — RESULT REVIEW (OUTPATIENT)
Age: 85
End: 2025-07-18

## 2025-07-18 ENCOUNTER — APPOINTMENT (OUTPATIENT)
Dept: NEPHROLOGY | Facility: CLINIC | Age: 85
End: 2025-07-18

## 2025-07-18 VITALS
BODY MASS INDEX: 24.44 KG/M2 | SYSTOLIC BLOOD PRESSURE: 134 MMHG | WEIGHT: 165 LBS | HEIGHT: 69 IN | DIASTOLIC BLOOD PRESSURE: 52 MMHG

## 2025-07-18 PROCEDURE — 96372 THER/PROPH/DIAG INJ SC/IM: CPT

## 2025-07-21 RX ORDER — EPOETIN ALFA-EPBX 4000 [IU]/ML
4000 INJECTION, SOLUTION INTRAVENOUS; SUBCUTANEOUS
Qty: 4 | Refills: 1 | Status: ACTIVE | COMMUNITY
Start: 2025-07-21 | End: 1900-01-01

## 2025-07-23 ENCOUNTER — APPOINTMENT (OUTPATIENT)
Dept: VASCULAR SURGERY | Facility: CLINIC | Age: 85
End: 2025-07-23
Payer: MEDICARE

## 2025-07-23 VITALS
BODY MASS INDEX: 24.44 KG/M2 | HEIGHT: 69 IN | OXYGEN SATURATION: 99 % | HEART RATE: 57 BPM | DIASTOLIC BLOOD PRESSURE: 54 MMHG | SYSTOLIC BLOOD PRESSURE: 126 MMHG | WEIGHT: 165 LBS

## 2025-07-23 PROCEDURE — 93970 EXTREMITY STUDY: CPT

## 2025-07-23 PROCEDURE — 99204 OFFICE O/P NEW MOD 45 MIN: CPT

## 2025-08-01 ENCOUNTER — APPOINTMENT (OUTPATIENT)
Dept: NEPHROLOGY | Facility: CLINIC | Age: 85
End: 2025-08-01
Payer: MEDICARE

## 2025-08-01 VITALS — HEART RATE: 66 BPM | SYSTOLIC BLOOD PRESSURE: 128 MMHG | DIASTOLIC BLOOD PRESSURE: 54 MMHG | OXYGEN SATURATION: 99 %

## 2025-08-01 PROCEDURE — 96372 THER/PROPH/DIAG INJ SC/IM: CPT

## 2025-08-01 RX ORDER — EPOETIN ALFA-EPBX 4000 [IU]/ML
4000 INJECTION, SOLUTION INTRAVENOUS; SUBCUTANEOUS
Refills: 0 | Status: COMPLETED | OUTPATIENT
Start: 2025-08-01

## 2025-08-01 RX ADMIN — EPOETIN ALFA-EPBX 0 UNIT/ML: 4000 INJECTION, SOLUTION INTRAVENOUS; SUBCUTANEOUS at 00:00

## 2025-08-04 ENCOUNTER — RESULT REVIEW (OUTPATIENT)
Age: 85
End: 2025-08-04

## 2025-08-07 ENCOUNTER — TRANSCRIPTION ENCOUNTER (OUTPATIENT)
Age: 85
End: 2025-08-07

## 2025-08-08 ENCOUNTER — APPOINTMENT (OUTPATIENT)
Dept: NEPHROLOGY | Facility: CLINIC | Age: 85
End: 2025-08-08
Payer: MEDICARE

## 2025-08-08 VITALS
WEIGHT: 167 LBS | HEIGHT: 69 IN | DIASTOLIC BLOOD PRESSURE: 60 MMHG | SYSTOLIC BLOOD PRESSURE: 122 MMHG | BODY MASS INDEX: 24.73 KG/M2

## 2025-08-08 PROCEDURE — 96372 THER/PROPH/DIAG INJ SC/IM: CPT

## 2025-08-08 RX ORDER — EPOETIN ALFA-EPBX 4000 [IU]/ML
4000 INJECTION, SOLUTION INTRAVENOUS; SUBCUTANEOUS
Refills: 0 | Status: COMPLETED | OUTPATIENT
Start: 2025-08-08

## 2025-08-08 RX ADMIN — EPOETIN ALFA-EPBX 0 UNIT/ML: 4000 INJECTION, SOLUTION INTRAVENOUS; SUBCUTANEOUS at 00:00

## 2025-08-15 ENCOUNTER — APPOINTMENT (OUTPATIENT)
Dept: NEPHROLOGY | Facility: CLINIC | Age: 85
End: 2025-08-15

## 2025-08-15 VITALS
HEIGHT: 69 IN | HEART RATE: 66 BPM | BODY MASS INDEX: 24.66 KG/M2 | SYSTOLIC BLOOD PRESSURE: 138 MMHG | OXYGEN SATURATION: 94 % | WEIGHT: 167 LBS | DIASTOLIC BLOOD PRESSURE: 62 MMHG

## 2025-08-15 PROCEDURE — 96372 THER/PROPH/DIAG INJ SC/IM: CPT

## 2025-08-18 RX ORDER — EPOETIN ALFA-EPBX 4000 [IU]/ML
4000 INJECTION, SOLUTION INTRAVENOUS; SUBCUTANEOUS
Refills: 0 | Status: COMPLETED | OUTPATIENT
Start: 2025-08-18

## 2025-08-18 RX ADMIN — EPOETIN ALFA-EPBX 0 UNIT/ML: 4000 INJECTION, SOLUTION INTRAVENOUS; SUBCUTANEOUS at 00:00

## 2025-08-21 ENCOUNTER — RESULT REVIEW (OUTPATIENT)
Age: 85
End: 2025-08-21

## 2025-08-22 ENCOUNTER — APPOINTMENT (OUTPATIENT)
Dept: NEPHROLOGY | Facility: CLINIC | Age: 85
End: 2025-08-22
Payer: MEDICARE

## 2025-08-22 VITALS
WEIGHT: 172 LBS | HEIGHT: 69 IN | SYSTOLIC BLOOD PRESSURE: 148 MMHG | BODY MASS INDEX: 25.48 KG/M2 | OXYGEN SATURATION: 94 % | DIASTOLIC BLOOD PRESSURE: 60 MMHG | HEART RATE: 64 BPM

## 2025-08-22 DIAGNOSIS — N18.9 CHRONIC KIDNEY DISEASE, UNSPECIFIED: ICD-10-CM

## 2025-08-22 DIAGNOSIS — I12.0 HYPERTENSIVE CHRONIC KIDNEY DISEASE WITH STAGE 5 CHRONIC KIDNEY DISEASE OR END STAGE RENAL DISEASE: ICD-10-CM

## 2025-08-22 DIAGNOSIS — N18.5 HYPERTENSIVE CHRONIC KIDNEY DISEASE WITH STAGE 5 CHRONIC KIDNEY DISEASE OR END STAGE RENAL DISEASE: ICD-10-CM

## 2025-08-22 DIAGNOSIS — I10 ESSENTIAL (PRIMARY) HYPERTENSION: ICD-10-CM

## 2025-08-22 DIAGNOSIS — R06.00 DYSPNEA, UNSPECIFIED: ICD-10-CM

## 2025-08-22 DIAGNOSIS — D63.1 CHRONIC KIDNEY DISEASE, UNSPECIFIED: ICD-10-CM

## 2025-08-22 PROCEDURE — 96372 THER/PROPH/DIAG INJ SC/IM: CPT

## 2025-08-22 RX ORDER — EPOETIN ALFA-EPBX 4000 [IU]/ML
4000 INJECTION, SOLUTION INTRAVENOUS; SUBCUTANEOUS
Refills: 0 | Status: COMPLETED | OUTPATIENT
Start: 2025-08-22

## 2025-08-22 RX ADMIN — EPOETIN ALFA-EPBX 0 UNIT/ML: 4000 INJECTION, SOLUTION INTRAVENOUS; SUBCUTANEOUS at 00:00

## 2025-08-25 ENCOUNTER — APPOINTMENT (OUTPATIENT)
Dept: NEPHROLOGY | Facility: CLINIC | Age: 85
End: 2025-08-25

## 2025-08-25 ENCOUNTER — RESULT REVIEW (OUTPATIENT)
Age: 85
End: 2025-08-25

## 2025-08-27 ENCOUNTER — APPOINTMENT (OUTPATIENT)
Dept: NEPHROLOGY | Facility: CLINIC | Age: 85
End: 2025-08-27

## 2025-08-27 ENCOUNTER — TRANSCRIPTION ENCOUNTER (OUTPATIENT)
Age: 85
End: 2025-08-27

## 2025-08-27 ENCOUNTER — APPOINTMENT (OUTPATIENT)
Dept: VASCULAR SURGERY | Facility: CLINIC | Age: 85
End: 2025-08-27

## 2025-09-03 ENCOUNTER — APPOINTMENT (OUTPATIENT)
Dept: PULMONOLOGY | Facility: CLINIC | Age: 85
End: 2025-09-03

## 2025-09-03 ENCOUNTER — APPOINTMENT (OUTPATIENT)
Dept: VASCULAR SURGERY | Facility: CLINIC | Age: 85
End: 2025-09-03
Payer: MEDICARE

## 2025-09-03 VITALS — BODY MASS INDEX: 24.44 KG/M2 | HEIGHT: 69 IN | WEIGHT: 165 LBS

## 2025-09-03 DIAGNOSIS — Z99.2 DEPENDENCE ON RENAL DIALYSIS: ICD-10-CM

## 2025-09-03 PROCEDURE — 99024 POSTOP FOLLOW-UP VISIT: CPT

## 2025-09-05 ENCOUNTER — APPOINTMENT (OUTPATIENT)
Dept: NEPHROLOGY | Facility: CLINIC | Age: 85
End: 2025-09-05

## 2025-09-05 ENCOUNTER — APPOINTMENT (OUTPATIENT)
Dept: FAMILY MEDICINE | Facility: CLINIC | Age: 85
End: 2025-09-05

## 2025-09-12 ENCOUNTER — APPOINTMENT (OUTPATIENT)
Dept: FAMILY MEDICINE | Facility: CLINIC | Age: 85
End: 2025-09-12

## 2025-09-19 ENCOUNTER — APPOINTMENT (OUTPATIENT)
Dept: GERIATRICS | Facility: CLINIC | Age: 85
End: 2025-09-19
Payer: MEDICARE

## 2025-09-19 VITALS
HEART RATE: 76 BPM | DIASTOLIC BLOOD PRESSURE: 58 MMHG | WEIGHT: 162 LBS | OXYGEN SATURATION: 97 % | TEMPERATURE: 96.7 F | SYSTOLIC BLOOD PRESSURE: 128 MMHG | BODY MASS INDEX: 23.92 KG/M2

## 2025-09-19 DIAGNOSIS — Z99.2 DEPENDENCE ON RENAL DIALYSIS: ICD-10-CM

## 2025-09-19 DIAGNOSIS — I50.9 HEART FAILURE, UNSPECIFIED: ICD-10-CM

## 2025-09-19 DIAGNOSIS — F43.20 ADJUSTMENT DISORDER, UNSPECIFIED: ICD-10-CM

## 2025-09-19 DIAGNOSIS — Z71.89 OTHER SPECIFIED COUNSELING: ICD-10-CM

## 2025-09-19 DIAGNOSIS — R91.8 OTHER NONSPECIFIC ABNORMAL FINDING OF LUNG FIELD: ICD-10-CM

## 2025-09-19 DIAGNOSIS — J44.9 CHRONIC OBSTRUCTIVE PULMONARY DISEASE, UNSPECIFIED: ICD-10-CM

## 2025-09-19 PROBLEM — Z13.820 OSTEOPOROSIS SCREENING: Status: ACTIVE | Noted: 2025-09-19

## 2025-09-19 PROCEDURE — 99205 OFFICE O/P NEW HI 60 MIN: CPT

## 2025-09-19 PROCEDURE — G2211 COMPLEX E/M VISIT ADD ON: CPT

## 2025-09-19 PROCEDURE — G0444 DEPRESSION SCREEN ANNUAL: CPT | Mod: 59

## 2025-09-19 RX ORDER — SACUBITRIL AND VALSARTAN 24; 26 MG/1; MG/1
24-26 TABLET, FILM COATED ORAL TWICE DAILY
Refills: 0 | Status: ACTIVE | COMMUNITY
Start: 2025-09-19

## 2025-09-19 RX ORDER — RENO CAPS 100; 1.5; 1.7; 20; 10; 1; 150; 5; 6 MG/1; MG/1; MG/1; MG/1; MG/1; MG/1; UG/1; MG/1; UG/1
1 CAPSULE ORAL
Refills: 0 | Status: ACTIVE | COMMUNITY
Start: 2025-09-19